# Patient Record
Sex: MALE | Race: BLACK OR AFRICAN AMERICAN | NOT HISPANIC OR LATINO | Employment: FULL TIME | URBAN - METROPOLITAN AREA
[De-identification: names, ages, dates, MRNs, and addresses within clinical notes are randomized per-mention and may not be internally consistent; named-entity substitution may affect disease eponyms.]

---

## 2017-05-10 ENCOUNTER — HOSPITAL ENCOUNTER (OUTPATIENT)
Dept: RADIOLOGY | Facility: MEDICAL CENTER | Age: 37
End: 2017-05-10
Attending: OTOLARYNGOLOGY
Payer: COMMERCIAL

## 2017-05-10 DIAGNOSIS — K11.23 CHRONIC PAROTITIS: ICD-10-CM

## 2017-05-10 DIAGNOSIS — R22.1 NECK MASS: ICD-10-CM

## 2017-05-10 DIAGNOSIS — J35.03: ICD-10-CM

## 2017-05-10 PROCEDURE — 70490 CT SOFT TISSUE NECK W/O DYE: CPT

## 2017-07-21 ENCOUNTER — APPOINTMENT (OUTPATIENT)
Dept: RADIOLOGY | Facility: MEDICAL CENTER | Age: 37
End: 2017-07-21
Attending: EMERGENCY MEDICINE
Payer: COMMERCIAL

## 2017-07-21 ENCOUNTER — HOSPITAL ENCOUNTER (EMERGENCY)
Facility: MEDICAL CENTER | Age: 37
End: 2017-07-21
Attending: EMERGENCY MEDICINE
Payer: COMMERCIAL

## 2017-07-21 VITALS
HEIGHT: 68 IN | WEIGHT: 242.06 LBS | TEMPERATURE: 98.4 F | DIASTOLIC BLOOD PRESSURE: 63 MMHG | SYSTOLIC BLOOD PRESSURE: 118 MMHG | BODY MASS INDEX: 36.69 KG/M2 | HEART RATE: 65 BPM | RESPIRATION RATE: 16 BRPM | OXYGEN SATURATION: 95 %

## 2017-07-21 DIAGNOSIS — M54.2 NECK PAIN ON LEFT SIDE: ICD-10-CM

## 2017-07-21 LAB
ANION GAP SERPL CALC-SCNC: 10 MMOL/L (ref 0–11.9)
BUN SERPL-MCNC: 16 MG/DL (ref 8–22)
CALCIUM SERPL-MCNC: 9.5 MG/DL (ref 8.5–10.5)
CHLORIDE SERPL-SCNC: 105 MMOL/L (ref 96–112)
CO2 SERPL-SCNC: 23 MMOL/L (ref 20–33)
CREAT SERPL-MCNC: 0.98 MG/DL (ref 0.5–1.4)
GFR SERPL CREATININE-BSD FRML MDRD: >60 ML/MIN/1.73 M 2
GLUCOSE SERPL-MCNC: 94 MG/DL (ref 65–99)
POTASSIUM SERPL-SCNC: 3.7 MMOL/L (ref 3.6–5.5)
SODIUM SERPL-SCNC: 138 MMOL/L (ref 135–145)

## 2017-07-21 PROCEDURE — 99283 EMERGENCY DEPT VISIT LOW MDM: CPT

## 2017-07-21 PROCEDURE — 80048 BASIC METABOLIC PNL TOTAL CA: CPT

## 2017-07-21 PROCEDURE — 70491 CT SOFT TISSUE NECK W/DYE: CPT

## 2017-07-21 PROCEDURE — 700117 HCHG RX CONTRAST REV CODE 255: Performed by: EMERGENCY MEDICINE

## 2017-07-21 RX ADMIN — IOHEXOL 80 ML: 350 INJECTION, SOLUTION INTRAVENOUS at 02:10

## 2017-07-21 ASSESSMENT — LIFESTYLE VARIABLES
DO YOU DRINK ALCOHOL: YES
TOTAL SCORE: 0
CONSUMPTION TOTAL: INCOMPLETE
EVER FELT BAD OR GUILTY ABOUT YOUR DRINKING: NO
EVER HAD A DRINK FIRST THING IN THE MORNING TO STEADY YOUR NERVES TO GET RID OF A HANGOVER: NO
TOTAL SCORE: 0
HAVE PEOPLE ANNOYED YOU BY CRITICIZING YOUR DRINKING: NO
TOTAL SCORE: 0
HAVE YOU EVER FELT YOU SHOULD CUT DOWN ON YOUR DRINKING: NO

## 2017-07-21 ASSESSMENT — PAIN SCALES - GENERAL: PAINLEVEL_OUTOF10: 10

## 2017-07-21 NOTE — ED PROVIDER NOTES
ED Provider Note    CHIEF COMPLAINT  Chief Complaint   Patient presents with   • Neck Pain     left anterior       HPI  Dean Mcmullen is a 36 y.o. male who presents here for evaluation of neck pain. The patient states that over the past 3-4 days he's been having some increased pain right at the site of his left anterior portion of sternocleidomastoid muscle. Patient states that he noticed increasing pain throughout the day today while at work when he was picking up boxes. He describes the pain as severe in intensity, sharp, worse with palpation over the site, and not associated with any injury or fever. The patient denies any previous medical history, but does state that he has a daughter who was found to have a cyst and a similar place in her neck, and he is concerned that this may be a similar presentation today.     REVIEW OF SYSTEMS   Patient denies fever, vision change, sore throat, chest pain, shortness of breath, nausea, dysuria, focal muscle pain, rashes, or neurologic deficits     PAST MEDICAL HISTORY   has a past medical history of Pseudotumor cerebri (10/21/2010) and Family history of high cholesterol.    SOCIAL HISTORY  Social History     Social History Main Topics   • Smoking status: Current Some Day Smoker   • Smokeless tobacco: Never Used      Comment: 2 cigs per day   • Alcohol Use: No   • Drug Use: No   • Sexual Activity: Yes       SURGICAL HISTORY   has past surgical history that includes appendectomy.    CURRENT MEDICATIONS  Home Medications     Reviewed by Heavenly Herring R.N. (Registered Nurse) on 07/21/17 at 0108  Med List Status: Partial    Medication Last Dose Status    amoxicillin-clavulanate (AUGMENTIN) 875-125 MG Tab  Active    Hydrocodone-Acetaminophen  MG/15ML Solution  Active    lidocaine (LIDODERM) 5 % PTCH not taking Active    ondansetron (ZOFRAN ODT) 4 MG TABLET DISPERSIBLE  Active                ALLERGIES  No Known Allergies    PHYSICAL EXAM  VITAL SIGNS: /71 mmHg  " Pulse 90  Temp(Src) 36.9 °C (98.4 °F)  Resp 18  Ht 1.727 m (5' 7.99\")  Wt 109.8 kg (242 lb 1 oz)  BMI 36.81 kg/m2  SpO2 99%   Pulse ox interpretation: I interpret this pulse ox as normal.  Constitutional: Alert in no apparent distress.  HENT: Head normocephalic, atraumatic, Bilateral external ears normal, Nose normal.  Eyes: Pupils are equal, extraocular movements intact, Conjunctiva normal, Non-icteric.   Neck: Normal range of motion, Supple, No stridor, point tenderness over the left middle sternocleidomastoid muscle, exquisite and tenderness but no overlying fluctuance, mass, erythema, or swelling is noted.   Lymphatic: No lymphadenopathy noted.   Cardiovascular: Regular rate and rhythm   Thorax & Lungs: No acute respiratory distress, No wheezing, No increased work of breathing.   Abdomen: Non-distended   Skin: Warm, Dry, No erythema, No rash.   Back: No bony tenderness, No CVA tenderness.   Extremities: Intact distal pulses, No edema, No tenderness, No cyanosis   Musculoskeletal: Good range of motion in all major joints. No tenderness to palpation or major deformities noted.   Neurologic: Alert , Normal motor function, Normal sensory function, No focal deficits noted.   Psychiatric: Affect normal, Judgment normal, Mood normal.       DIAGNOSTIC STUDIES / PROCEDURES    LABS  Labs Reviewed   BASIC METABOLIC PANEL   ESTIMATED GFR       RADIOLOGY  CT-SOFT TISSUE NECK WITH   Final Result         1.  CT of the neck soft tissues with contrast within normal limits.      2.  Previous surgical resection of right-sided submandibular gland.          COURSE & MEDICAL DECISION MAKING  Pertinent Labs & Imaging studies reviewed. (See chart for details)    Patient presented here for evaluation of left-sided neck pain. Considerations for this patient included a neck abscess, swollen lymph node, and nerve inflammation. Here CT scan of the neck with contrast shows no evidence of abscess, swollen lymph node, or any other " etiology for the patient's symptoms. Given this is a little bit unclear but it may be secondary to nerve inflammation. Given this, plan to discharge the patient with primary care follow-up, and return precautions including difficulty swallowing his secretions, fever, or any other new or concerning symptoms.    The patient will return for worsening symptoms and is stable at the time of discharge. The patient verbalizes understanding.    The patient is referred to a primary physician for blood pressure management, diabetic screening, and for all other preventative health concerns should they be present.     FINAL IMPRESSION  1. Left neck pain  2.   3.          Electronically signed by: Albert Bynum, 7/21/2017 1:39 AM    This record was made with a voice recognition software. I have tried to correct any grammar, spelling or context errors to the best of my ability, but errors may still remain. Interpretation of this chart should be taken in this context.

## 2017-07-21 NOTE — ED AVS SNAPSHOT
7/21/2017    Dean Mcmullen  [address] [place] [address]    Dear Dean PHILLIPS:    [facility] wants to ensure your discharge home is safe and you or your loved ones have had all of your questions answered regarding your care after you leave the hospital.    Below is a list of resources and contact information should you have any questions regarding your hospital stay, follow-up instructions, or active medical symptoms.    Questions or Concerns Regarding… Contact   Medical Questions Related to Your Discharge  (7 days a week, 8am-5pm) Contact a Nurse Care Coordinator   [phone]   Medical Questions Not Related to Your Discharge  (24 hours a day / 7 days a week)  Contact the Nurse Health Line   [phone]    Medications or Discharge Instructions Refer to your discharge packet   or contact your [facility] Primary Care Provider   [phone]   Follow-up Appointment(s) Schedule your appointment via [employer]   or contact Scheduling [phone]   Billing Review your statement via [employer]  or contact Billing [phone]   Medical Records Review your records via [employer]   or contact Medical Records [phone]     You may receive a telephone call within two days of discharge. This call is to make certain you understand your discharge instructions and have the opportunity to have any questions answered. You can also easily access your medical information, test results and upcoming appointments via the [employer] free online health management tool. You can learn more and sign up at [employer]. For assistance setting up your [employer] account, please call [phone].    Once again, we want to ensure your discharge home is safe and that you have a clear understanding of any next steps in your care. If you have any questions or concerns, please do not hesitate to contact us, we are here for you. Thank you for choosing [facility] for your healthcare needs.    Sincerely,    Your [facility] Healthcare Team

## 2017-07-21 NOTE — ED NOTES
Discharge instructions given to patient, a verbal understanding of all instructions was stated. IV removed, cathlon intact, site without s/s of infection. Pt preferred to walk out accompanied by wife. VSS, all belongings accounted for.

## 2017-07-21 NOTE — ED NOTES
Pt. States he had his right salivary gland removed in April of this year. Pt. States similar pain to that experience but now on his left side. Pt. Has some swelling and tenderness to the left side of his neck. Pt. States 8/10 pain. Pt. And family updated on the plan of care for ERP to see. Call light within reach. Will continue to monitor.

## 2017-07-21 NOTE — ED AVS SNAPSHOT
Home Care Instructions                                                                                                                Dean Mcmullen   MRN: [id number]    Department:  [facility], Emergency Dept   Date of Visit:  7/21/2017            [facility], Emergency Dept    [address] [facility] [address]    Phone:  [phone]      You were seen by     Albert Bynum M.D.      Your Diagnosis Was     Neck pain on left side     M54.2       These are the medications you received during your hospitalization from 07/21/2017 0019 to 07/21/2017 0252     Date/Time Order Dose Route Action    07/21/2017 0210 iohexol (OMNIPAQUE) 350 mg/mL 80 mL Intravenous Given      Follow-up Information     1. Schedule an appointment as soon as possible for a visit with Xiao Bello M.D..    Specialty:  Family Medicine    Contact information    [address]  [facility] [address] [phone]        Medication Information     Review all of your home medications and newly ordered medications with your primary doctor and/or pharmacist as soon as possible. Follow medication instructions as directed by your doctor and/or pharmacist.     Please keep your complete medication list with you and share with your physician. Update the information when medications are discontinued, doses are changed, or new medications (including over-the-counter products) are added; and carry medication information at all times in the event of emergency situations.               Medication List      ASK your doctor about these medications        Instructions    Morning Afternoon Evening Bedtime    amoxicillin-clavulanate 875-125 MG Tabs   Commonly known as:  AUGMENTIN        Take 1 Tab by mouth 2 times a day.   Dose:  1 Tab                        Hydrocodone-Acetaminophen  MG/15ML Soln        Take 10-15 mL by mouth every 6 hours as needed (pain).   Dose:  10-15 mL                        lidocaine 5 %  Ptch   Commonly known as:  LIDODERM        Apply 1 Patch to skin as directed every 24 hours.   Dose:  1 Patch                        ondansetron 4 MG Tbdp   Commonly known as:  ZOFRAN ODT        Take 1 Tab by mouth every 8 hours as needed for Nausea/Vomiting.   Dose:  4 mg                                Procedures and tests performed during your visit     BASIC METABOLIC PANEL    CONSENT FOR CONTRAST INJECTION    CT-SOFT TISSUE NECK WITH    ESTIMATED GFR        Discharge Instructions       Please return to the emergency department if you develop fever, increased neck swelling, difficulty speaking, or swallowing her saliva, or any other new or concerning symptoms.          Patient Information     Patient Information    Following emergency treatment: all patient requiring follow-up care must return either to a private physician or a clinic if your condition worsens before you are able to obtain further medical attention, please return to the emergency room.     Billing Information    At [facility], we work to make the billing process streamlined for our patients.  Our Representatives are here to answer any questions you may have regarding your hospital bill.  If you have insurance coverage and have supplied your insurance information to us, we will submit a claim to your insurer on your behalf.  Should you have any questions regarding your bill, we can be reached online or by phone as follows:  Online: You are able pay your bills online or live chat with our representatives about any billing questions you may have. We are here to help Monday - Friday from 8:00am to 7:30pm and 9:00am - 12:00pm on Saturdays.  Please visit [email or web address]  for more information.   Phone:  [phone] or [phone]    Please note that your emergency physician, surgeon, pathologist, radiologist, anesthesiologist, and other specialists are not employed by [facility] and will therefore bill separately  for their services.  Please contact them directly for any questions concerning their bills at the numbers below:     Emergency Physician Services:  [phone]  [facility]:  [phone]  [facility]:  [phone]  [facility]:  [phone]    1. Your final bill may vary from the amount quoted upon discharge if all procedures are not complete at that time, or if your doctor has additional procedures of which we are not aware. You will receive an additional bill if you return to the Emergency Department at [facility] access hospital for suture removal regardless of the facility of which the sutures were placed.     2. Please arrange for settlement of this account at the emergency registration.    3. All self-pay accounts are due in full at the time of treatment.  If you are unable to meet this obligation then payment is expected within 4-5 days.     4. If you have had radiology studies (CT, X-ray, Ultrasound, MRI), you have received a preliminary result during your emergency department visit. Please contact the radiology department [phone] to receive a copy of your final result. Please discuss the Final result with your primary physician or with the follow up physician provided.     Crisis Hotline:  [place] Crisis Hotline:  6-632-BFDMLNE or 1-154.375.1154  [place] Crisis Hotline:    1-459.639.8462 or 012-515-5771         ED Discharge Follow Up Questions    1. In order to provide you with very good care, we would like to follow up with a phone call in the next few days.  May we have your permission to contact you?     YES /  NO    2. What is the best phone number to call you? (       )_____-__________    3. What is the best time to call you?      Morning  /  Afternoon  /  Evening                   Patient Signature:  ____________________________________________________________    Date:  ____________________________________________________________

## 2017-07-21 NOTE — ED NOTES
Dean Mcmullen  36 y.o.  male  Chief Complaint   Patient presents with   • Neck Pain     left anterior     Present c/o left anterior neck pain started yesterday. Small lump noted. Pain with palpation. Hx of salivary stone removed surgically but on the right side. Airway clear.

## 2017-07-21 NOTE — DISCHARGE INSTRUCTIONS
Please return to the emergency department if you develop fever, increased neck swelling, difficulty speaking, or swallowing her saliva, or any other new or concerning symptoms.

## 2018-08-07 ENCOUNTER — HOSPITAL ENCOUNTER (OUTPATIENT)
Dept: RADIOLOGY | Facility: MEDICAL CENTER | Age: 38
End: 2018-08-07
Attending: OTOLARYNGOLOGY
Payer: COMMERCIAL

## 2018-08-07 DIAGNOSIS — R22.1 NECK MASS: ICD-10-CM

## 2018-08-07 PROCEDURE — 70491 CT SOFT TISSUE NECK W/DYE: CPT

## 2018-08-07 PROCEDURE — 700117 HCHG RX CONTRAST REV CODE 255: Performed by: OTOLARYNGOLOGY

## 2018-08-07 RX ADMIN — IOHEXOL 100 ML: 350 INJECTION, SOLUTION INTRAVENOUS at 11:14

## 2018-10-23 ENCOUNTER — HOSPITAL ENCOUNTER (OUTPATIENT)
Dept: RADIOLOGY | Facility: MEDICAL CENTER | Age: 38
End: 2018-10-23
Attending: OTOLARYNGOLOGY
Payer: COMMERCIAL

## 2018-10-23 DIAGNOSIS — R22.1 NECK MASS: ICD-10-CM

## 2018-10-23 PROCEDURE — 76536 US EXAM OF HEAD AND NECK: CPT

## 2018-10-31 ENCOUNTER — OFFICE VISIT (OUTPATIENT)
Dept: MEDICAL GROUP | Facility: MEDICAL CENTER | Age: 38
End: 2018-10-31
Payer: COMMERCIAL

## 2018-10-31 ENCOUNTER — HOSPITAL ENCOUNTER (OUTPATIENT)
Dept: LAB | Facility: MEDICAL CENTER | Age: 38
End: 2018-10-31
Attending: NURSE PRACTITIONER
Payer: COMMERCIAL

## 2018-10-31 ENCOUNTER — HOSPITAL ENCOUNTER (OUTPATIENT)
Dept: RADIOLOGY | Facility: MEDICAL CENTER | Age: 38
End: 2018-10-31
Attending: NURSE PRACTITIONER
Payer: COMMERCIAL

## 2018-10-31 VITALS
RESPIRATION RATE: 16 BRPM | TEMPERATURE: 97.2 F | HEART RATE: 86 BPM | OXYGEN SATURATION: 97 % | BODY MASS INDEX: 38.3 KG/M2 | HEIGHT: 67 IN | WEIGHT: 244 LBS | DIASTOLIC BLOOD PRESSURE: 76 MMHG | SYSTOLIC BLOOD PRESSURE: 130 MMHG

## 2018-10-31 DIAGNOSIS — G89.29 CHRONIC RIGHT-SIDED LOW BACK PAIN WITH RIGHT-SIDED SCIATICA: ICD-10-CM

## 2018-10-31 DIAGNOSIS — M25.50 MULTIPLE JOINT PAIN: ICD-10-CM

## 2018-10-31 DIAGNOSIS — M54.41 CHRONIC RIGHT-SIDED LOW BACK PAIN WITH RIGHT-SIDED SCIATICA: ICD-10-CM

## 2018-10-31 DIAGNOSIS — K11.9 SALIVARY GLAND DISTURBANCE: ICD-10-CM

## 2018-10-31 LAB
ERYTHROCYTE [SEDIMENTATION RATE] IN BLOOD BY WESTERGREN METHOD: 12 MM/HOUR (ref 0–15)
RHEUMATOID FACT SER IA-ACNC: <10 IU/ML (ref 0–14)

## 2018-10-31 PROCEDURE — 99203 OFFICE O/P NEW LOW 30 MIN: CPT | Performed by: NURSE PRACTITIONER

## 2018-10-31 PROCEDURE — 86431 RHEUMATOID FACTOR QUANT: CPT

## 2018-10-31 PROCEDURE — 86200 CCP ANTIBODY: CPT

## 2018-10-31 PROCEDURE — 84550 ASSAY OF BLOOD/URIC ACID: CPT

## 2018-10-31 PROCEDURE — 36415 COLL VENOUS BLD VENIPUNCTURE: CPT

## 2018-10-31 PROCEDURE — 85652 RBC SED RATE AUTOMATED: CPT

## 2018-10-31 PROCEDURE — 77077 JOINT SURVEY SINGLE VIEW: CPT

## 2018-10-31 ASSESSMENT — ENCOUNTER SYMPTOMS: BACK PAIN: 1

## 2018-10-31 ASSESSMENT — PATIENT HEALTH QUESTIONNAIRE - PHQ9: CLINICAL INTERPRETATION OF PHQ2 SCORE: 0

## 2018-10-31 NOTE — PROGRESS NOTES
Subjective:      Dean Mcmullen is a 38 y.o. male who presents with Establish Care and Other (joint pain)        CC: This is a prior patient of Dr. Palmer here today to establish care and for issues with joint pain and back pain.  She was last seen by her PCP in 2014.    HPI Dean Mcmullen      1. Chronic right-sided low back pain with right-sided sciatica  Patient reports his problems started about 4 months ago at work when he was lifting a heavy box for his job as a .  He was seen under Workmen's Comp. for this and attended physical therapy and the case was closed.  He states he is still having some issues with low back pain mostly on the right side and there is sometimes radiation of pain to his right upper leg and sometimes to his ankle.  It is worse with prolonged sitting or bending.  It is better with rest.  He does not like to take medications.  He denies loss of bowel or bladder control or weakness of the extremities.    2. Multiple joint pain  Patient reports that he has noticed over the past year or 2 that he has been having pain in multiple other joints as well.  It is worse in his hands bilaterally but sometimes it occurs in his knees and elbows.  He does work at a job which requires much lifting and sitting.  He has not noticed redness or swelling of the joints.    3. Salivary gland disturbance  Patient is currently following with ENT because of recurring swelling of the salivary gland on the right side.  He recently has had imaging and may be going for biopsy in the near future.  Social History   Substance Use Topics   • Smoking status: Former Smoker     Types: Cigarettes     Quit date: 7/31/2018   • Smokeless tobacco: Never Used      Comment: 2 cigs per day   • Alcohol use No     No current outpatient prescriptions on file.     No current facility-administered medications for this visit.      Past Medical History:   Diagnosis Date   • Family history of high cholesterol    • Pseudotumor  "cerebri 10/21/2010     Family History   Problem Relation Age of Onset   • Diabetes Father    • Cancer Maternal Grandmother 55        colon   • Cancer Paternal Grandfather         lung       Review of Systems   Musculoskeletal: Positive for back pain and joint pain.   All other systems reviewed and are negative.         Objective:     /76 (BP Location: Right arm, Patient Position: Sitting, BP Cuff Size: Adult)   Pulse 86   Temp 36.2 °C (97.2 °F) (Temporal)   Resp 16   Ht 1.702 m (5' 7\")   Wt 110.7 kg (244 lb)   SpO2 97%   BMI 38.22 kg/m²      Physical Exam   Constitutional: He is oriented to person, place, and time. He appears well-developed and well-nourished. No distress.   HENT:   Head: Normocephalic and atraumatic.   Right Ear: External ear normal.   Left Ear: External ear normal.   Nose: Nose normal.   Mouth/Throat: Oropharynx is clear and moist.   Eyes: Conjunctivae are normal. Right eye exhibits no discharge. Left eye exhibits no discharge.   Neck: Normal range of motion. Neck supple. No tracheal deviation present. No thyromegaly present.   Cardiovascular: Normal rate, regular rhythm and normal heart sounds.    No murmur heard.  Pulmonary/Chest: Effort normal and breath sounds normal. No respiratory distress. He has no wheezes. He has no rales.   Lymphadenopathy:     He has no cervical adenopathy.   Neurological: He is alert and oriented to person, place, and time. Coordination normal.   5/5 strength of lower extremities bilaterally.   Skin: Skin is warm and dry. No rash noted. He is not diaphoretic. No erythema.   Scar on right ear area needs chin with some mild swelling.   Psychiatric: He has a normal mood and affect. His behavior is normal. Judgment and thought content normal.   Nursing note and vitals reviewed.              Assessment/Plan:     1. Chronic right-sided low back pain with right-sided sciatica  Patient symptoms have now been present for a few months and it is no longer a Workmen's " Compensation case.  He states he did have x-rays which were normal at his Workmen's Compensation provider.  He would be willing to try some more physical therapy and now would like to go on to physiatry to look at possibilities including epidural injections since the pain is still present.  - REFERRAL TO PHYSIATRY (PMR)  - REFERRAL TO PHYSICAL THERAPY Reason for Therapy: Eval/Treat/Report    2. Multiple joint pain  Patient's wife would like him to be checked for rheumatoid arthritis and I will do so and if positive, will refer him on to rheumatology.  Otherwise he will follow with physiatry.  - RHEUMATOID ARTHRITIS FACTOR; Future  - CCP ANTIBODY; Future  - WESTERGREN SED RATE; Future  - URIC ACID; Future  - DX-JOINT SURVEY-HANDS SINGLE VIEW; Future    3. Salivary gland disturbance  Patient will continue to follow with ENT who is working this up.

## 2018-11-01 LAB — URATE SERPL-MCNC: 6.7 MG/DL (ref 2.5–8.3)

## 2018-11-02 DIAGNOSIS — Z00.00 ROUTINE GENERAL MEDICAL EXAMINATION AT A HEALTH CARE FACILITY: ICD-10-CM

## 2018-11-02 LAB — CCP IGG SERPL-ACNC: 3 UNITS (ref 0–19)

## 2018-11-06 ENCOUNTER — HOSPITAL ENCOUNTER (OUTPATIENT)
Dept: RADIOLOGY | Facility: MEDICAL CENTER | Age: 38
End: 2018-11-06
Attending: OTOLARYNGOLOGY
Payer: COMMERCIAL

## 2018-11-06 DIAGNOSIS — R22.1 LOCALIZED SWELLING, MASS AND LUMP, NECK: ICD-10-CM

## 2018-11-06 PROCEDURE — 10022 IR-NEEDLE BX-THYROID: CPT

## 2018-11-06 PROCEDURE — 88112 CYTOPATH CELL ENHANCE TECH: CPT

## 2018-11-07 LAB — CYTOLOGY REG CYTOL: NORMAL

## 2018-11-07 NOTE — PROGRESS NOTES
"Patient given [facility] \"Preventing the Spread of Infection\" Brochure upon being checked in.     US guided right submandibular nodule fine needle aspiration done by Dr. Oh; NON-SEDATION  (no H&P required as this is a NON SEDATION procedure); right anterior aspect of neck access site; procedural RN: Nuha; time out completed by all staff; 1 jar of cytolyt obtained and sent to pathology lab; pt tolerated the procedure well; pt remained stable pre/intra/post procedure; all questions and concerns answered prior to being d/c; patient provided with appropriate education for procedure; pt d/c home.     "

## 2019-01-14 ENCOUNTER — APPOINTMENT (OUTPATIENT)
Dept: RADIOLOGY | Facility: IMAGING CENTER | Age: 39
End: 2019-01-14
Attending: FAMILY MEDICINE
Payer: COMMERCIAL

## 2019-01-14 ENCOUNTER — OFFICE VISIT (OUTPATIENT)
Dept: URGENT CARE | Facility: CLINIC | Age: 39
End: 2019-01-14
Payer: COMMERCIAL

## 2019-01-14 VITALS
SYSTOLIC BLOOD PRESSURE: 110 MMHG | WEIGHT: 237.4 LBS | DIASTOLIC BLOOD PRESSURE: 72 MMHG | HEART RATE: 95 BPM | RESPIRATION RATE: 20 BRPM | BODY MASS INDEX: 37.26 KG/M2 | TEMPERATURE: 99.8 F | OXYGEN SATURATION: 92 % | HEIGHT: 67 IN

## 2019-01-14 DIAGNOSIS — R68.89 FLU-LIKE SYMPTOMS: ICD-10-CM

## 2019-01-14 LAB
FLUAV+FLUBV AG SPEC QL IA: NEGATIVE
INT CON NEG: NORMAL
INT CON POS: NORMAL

## 2019-01-14 PROCEDURE — 87804 INFLUENZA ASSAY W/OPTIC: CPT | Performed by: FAMILY MEDICINE

## 2019-01-14 PROCEDURE — 99214 OFFICE O/P EST MOD 30 MIN: CPT | Performed by: FAMILY MEDICINE

## 2019-01-14 PROCEDURE — 71046 X-RAY EXAM CHEST 2 VIEWS: CPT | Mod: 26 | Performed by: FAMILY MEDICINE

## 2019-01-14 RX ORDER — DICLOFENAC SODIUM 75 MG/1
75 TABLET, DELAYED RELEASE ORAL 2 TIMES DAILY
Qty: 30 TAB | Refills: 0 | Status: SHIPPED | OUTPATIENT
Start: 2019-01-14 | End: 2020-07-24

## 2019-01-14 ASSESSMENT — ENCOUNTER SYMPTOMS
FEVER: 1
COUGH: 1
NAUSEA: 0
HEADACHES: 0
ABDOMINAL PAIN: 0
DIARRHEA: 0
VOMITING: 0
SHORTNESS OF BREATH: 0
SORE THROAT: 1

## 2019-01-14 NOTE — LETTER
January 14, 2019    To Whom It May Concern:         This is confirmation that Dean Mcmullen attended his scheduled appointment with Jewel Parker M.D. on 1/14/19.  Please excuse him from work.           If you have any questions please do not hesitate to call me at the phone number listed below.    Sincerely,          Jewel Parker M.D.  [phone]

## 2019-01-15 NOTE — PROGRESS NOTES
"Subjective:     Dean Mcmullen is a 38 y.o. male who presents for Cough (X 3 days, body aches, chest tight, chest congestion, wheezing, fever, cough)    HPI  Pt presents for evaluation of a new problem  Pt feeling ill for the past 3 days   Having fevers on and off   Having moderate dry cough which is improving   Myalgias are the worst symptom   Pain is mainly in the back, constantly present, and does not radiate   Nothing makes myalgias better   +Rhinorrhea   Thinks he is wheezing     Review of Systems   Constitutional: Positive for fever.   HENT: Positive for congestion and sore throat.    Respiratory: Positive for cough. Negative for shortness of breath.    Cardiovascular: Negative for chest pain.   Gastrointestinal: Negative for abdominal pain, diarrhea, nausea and vomiting.   Skin: Negative for rash.   Neurological: Negative for headaches.     PMH: No hx of asthma, +hx of salivary stones  MEDS: No daily meds   ALLERGIES: No Known Allergies  SURGHX:   Past Surgical History:   Procedure Laterality Date   • APPENDECTOMY     • LYMPH NODE SAMPLING       SOCHX:  reports that he quit smoking about 5 months ago. His smoking use included Cigarettes. He has never used smokeless tobacco. He reports that he does not drink alcohol or use drugs.  FH: Family history was reviewed, not contributing to acute infection      Objective:   /72 (BP Location: Left arm, Patient Position: Sitting, BP Cuff Size: Large adult)   Pulse 95   Temp 37.7 °C (99.8 °F) (Temporal)   Resp 20   Ht 1.695 m (5' 6.73\")   Wt 107.7 kg (237 lb 6.4 oz)   SpO2 92%   BMI 37.48 kg/m²     Physical Exam   Constitutional: He appears well-developed and well-nourished. No distress.   HENT:   Head: Normocephalic and atraumatic.   Right Ear: External ear normal.   Left Ear: External ear normal.   Nose: Nose normal.   Mouth/Throat: Oropharynx is clear and moist. No oropharyngeal exudate.   Eyes: Pupils are equal, round, and reactive to light. " Conjunctivae and EOM are normal. Right eye exhibits no discharge. Left eye exhibits no discharge. No scleral icterus.   Neck: Normal range of motion. No tracheal deviation present.   Cardiovascular: Normal rate, regular rhythm and normal heart sounds.    Pulmonary/Chest: Effort normal.   Scattered wheezes and rhonchi throughout  No focal rales   Musculoskeletal: Normal range of motion.   Neurological: He is alert.   Skin: Skin is warm and dry. No rash noted. He is not diaphoretic.   Psychiatric: He has a normal mood and affect. His behavior is normal. Judgment and thought content normal.     Assessment/Plan:   Assessment    1. Flu-like symptoms  Patient is a 38-year-old male with flulike symptoms for the past 3 days.  Rapid influenza test negative.  Likely has parainfluenza or similar viral illness.  Chest x-ray does not show infiltrates.  Since his myalgias are the worst symptom, will treat with anti-inflammatories.  His cough is improving and patient declines prescription cough medication.  Reviewed follow-up precautions.  Follow-up as needed.  - POCT Influenza A/B  - DX-CHEST-2 VIEWS; Future  - diclofenac EC (VOLTAREN) 75 MG Tablet Delayed Response; Take 1 Tab by mouth 2 times a day.  Dispense: 30 Tab; Refill: 0
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
## 2019-08-19 ENCOUNTER — OFFICE VISIT (OUTPATIENT)
Dept: URGENT CARE | Facility: CLINIC | Age: 39
End: 2019-08-19
Payer: COMMERCIAL

## 2019-08-19 VITALS
SYSTOLIC BLOOD PRESSURE: 118 MMHG | DIASTOLIC BLOOD PRESSURE: 76 MMHG | HEIGHT: 68 IN | HEART RATE: 67 BPM | RESPIRATION RATE: 18 BRPM | BODY MASS INDEX: 37.28 KG/M2 | TEMPERATURE: 99 F | OXYGEN SATURATION: 97 % | WEIGHT: 246 LBS

## 2019-08-19 DIAGNOSIS — M25.571 CHRONIC PAIN OF RIGHT ANKLE: ICD-10-CM

## 2019-08-19 DIAGNOSIS — M79.661 RIGHT CALF PAIN: ICD-10-CM

## 2019-08-19 DIAGNOSIS — G89.29 CHRONIC PAIN OF RIGHT ANKLE: ICD-10-CM

## 2019-08-19 PROCEDURE — 99214 OFFICE O/P EST MOD 30 MIN: CPT | Performed by: NURSE PRACTITIONER

## 2019-08-19 RX ORDER — MELOXICAM 7.5 MG/1
7.5 TABLET ORAL 2 TIMES DAILY PRN
Refills: 2 | COMMUNITY
Start: 2019-08-15 | End: 2020-10-16

## 2019-08-19 RX ORDER — GABAPENTIN 100 MG/1
CAPSULE ORAL
Refills: 0 | COMMUNITY
Start: 2019-08-15 | End: 2020-10-16

## 2019-08-19 RX ORDER — METHYLPREDNISOLONE 4 MG/1
4 TABLET ORAL DAILY
Qty: 1 KIT | Refills: 0 | Status: SHIPPED | OUTPATIENT
Start: 2019-08-19 | End: 2020-07-24

## 2019-08-20 ENCOUNTER — TELEPHONE (OUTPATIENT)
Dept: URGENT CARE | Facility: CLINIC | Age: 39
End: 2019-08-20

## 2019-08-20 ENCOUNTER — HOSPITAL ENCOUNTER (OUTPATIENT)
Dept: RADIOLOGY | Facility: MEDICAL CENTER | Age: 39
End: 2019-08-20
Attending: NURSE PRACTITIONER
Payer: COMMERCIAL

## 2019-08-20 DIAGNOSIS — M79.661 RIGHT CALF PAIN: ICD-10-CM

## 2019-08-20 PROCEDURE — 93971 EXTREMITY STUDY: CPT | Mod: RT

## 2019-08-20 NOTE — TELEPHONE ENCOUNTER
Called pt to inform him of the ultrasound appointment for today. Check in at 1:45pm for today @ [address] and bring ID & Insurance. Pt understands message.

## 2019-08-24 ASSESSMENT — ENCOUNTER SYMPTOMS
TINGLING: 0
FOCAL WEAKNESS: 0
SENSORY CHANGE: 0
FEVER: 0
MYALGIAS: 0
CHILLS: 0

## 2019-08-24 NOTE — PROGRESS NOTES
Subjective:      Dean Mcmullen is a 39 y.o. male who presents with Ankle Pain (x2MO or more, getting worse, right ankle pain radiates up to calf, sharp pain, certain movements makes it worse, cannot step on it out of nowhere)            HPI New. 39 year old male with ankle pain x 2 months. This pain is moderate, radiating up his leg, sharp and difficulty with ambulation. Denies any known injury, swelling or erythema. He has no fever, chills or other joint pain. No history of gout. Pain is not constant but is concerning as it is worsening for him. His spouse is concerned about blood clot. Denies any foot or knee pain. Taking ibuprofen/aleve otc with minimal relief.  Patient has no known allergies.  Current Outpatient Medications on File Prior to Visit   Medication Sig Dispense Refill   • gabapentin (NEURONTIN) 100 MG Cap TAKE ONE CAPSULE BY MOUTH EVERY MORNING AND TAKE 2 CAPSULES EVERY EVENING  0   • meloxicam (MOBIC) 7.5 MG Tab Take 7.5 mg by mouth 2 times a day as needed.  2   • diclofenac EC (VOLTAREN) 75 MG Tablet Delayed Response Take 1 Tab by mouth 2 times a day. (Patient not taking: Reported on 2019) 30 Tab 0     No current facility-administered medications on file prior to visit.      Social History     Socioeconomic History   • Marital status: Single     Spouse name: Not on file   • Number of children: Not on file   • Years of education: Not on file   • Highest education level: Not on file   Occupational History   • Occupation: , package     Employer: [employer]   Social Needs   • Financial resource strain: Not on file   • Food insecurity:     Worry: Not on file     Inability: Not on file   • Transportation needs:     Medical: Not on file     Non-medical: Not on file   Tobacco Use   • Smoking status: Former Smoker     Types: Cigarettes     Last attempt to quit: 2018     Years since quittin.0   • Smokeless tobacco: Never Used   • Tobacco comment: 2 cigs per day   Substance and Sexual Activity  "  • Alcohol use: No   • Drug use: No   • Sexual activity: Yes     Partners: Female   Lifestyle   • Physical activity:     Days per week: Not on file     Minutes per session: Not on file   • Stress: Not on file   Relationships   • Social connections:     Talks on phone: Not on file     Gets together: Not on file     Attends Sikhism service: Not on file     Active member of club or organization: Not on file     Attends meetings of clubs or organizations: Not on file     Relationship status: Not on file   • Intimate partner violence:     Fear of current or ex partner: Not on file     Emotionally abused: Not on file     Physically abused: Not on file     Forced sexual activity: Not on file   Other Topics Concern   • Not on file   Social History Narrative   • Not on file     Breast Cancer-related family history is not on file.      Review of Systems   Constitutional: Negative for chills, fever and malaise/fatigue.   Musculoskeletal: Positive for joint pain. Negative for myalgias.   Neurological: Negative for tingling, sensory change and focal weakness.          Objective:     /76 (BP Location: Left arm, Patient Position: Sitting, BP Cuff Size: Large adult)   Pulse 67   Temp 37.2 °C (99 °F) (Temporal)   Resp 18   Ht 1.727 m (5' 8\")   Wt 111.6 kg (246 lb)   SpO2 97%   BMI 37.40 kg/m²      Physical Exam   Constitutional: He is oriented to person, place, and time. He appears well-developed and well-nourished. No distress.   Cardiovascular: Normal rate, regular rhythm and normal heart sounds.   No murmur heard.  Pulmonary/Chest: Effort normal and breath sounds normal. No respiratory distress.   Musculoskeletal:        Right ankle: He exhibits decreased range of motion. He exhibits no swelling, no ecchymosis and no deformity. Tenderness. Lateral malleolus and proximal fibula tenderness found.   Neurological: He is alert and oriented to person, place, and time.   Skin: Skin is warm and dry. No erythema. "   Psychiatric: He has a normal mood and affect. His behavior is normal. Thought content normal.   Vitals reviewed.              Assessment/Plan:     1. Chronic pain of right ankle  methylPREDNISolone (MEDROL DOSEPAK) 4 MG Tablet Therapy Pack   2. Right calf pain  US-EXTREMITY VENOUS LOWER UNILAT RIGHT     Will call with ultrasound results but suspicion for this is low.  Medrol trial   Differential diagnosis, natural history, supportive care, and indications for immediate follow-up discussed at length.

## 2020-07-24 DIAGNOSIS — L05.91 INFECTED PILONIDAL CYST: ICD-10-CM

## 2020-07-24 RX ORDER — SULFAMETHOXAZOLE AND TRIMETHOPRIM 800; 160 MG/1; MG/1
1 TABLET ORAL 2 TIMES DAILY
Qty: 14 TAB | Refills: 0 | Status: SHIPPED | OUTPATIENT
Start: 2020-07-24 | End: 2020-07-31

## 2020-10-16 ENCOUNTER — OFFICE VISIT (OUTPATIENT)
Dept: MEDICAL GROUP | Facility: MEDICAL CENTER | Age: 40
End: 2020-10-16
Payer: COMMERCIAL

## 2020-10-16 VITALS
HEIGHT: 67 IN | OXYGEN SATURATION: 100 % | DIASTOLIC BLOOD PRESSURE: 68 MMHG | HEART RATE: 61 BPM | WEIGHT: 270 LBS | BODY MASS INDEX: 42.38 KG/M2 | TEMPERATURE: 98.4 F | SYSTOLIC BLOOD PRESSURE: 114 MMHG | RESPIRATION RATE: 16 BRPM

## 2020-10-16 DIAGNOSIS — D76.3 ROSAI-DORFMAN DISEASE (HCC): ICD-10-CM

## 2020-10-16 DIAGNOSIS — G93.2 BENIGN INTRACRANIAL HYPERTENSION: Chronic | ICD-10-CM

## 2020-10-16 DIAGNOSIS — E66.01 MORBID OBESITY (HCC): ICD-10-CM

## 2020-10-16 DIAGNOSIS — R73.09 ELEVATED GLUCOSE LEVEL: ICD-10-CM

## 2020-10-16 DIAGNOSIS — Z63.4 RECENT BEREAVEMENT: ICD-10-CM

## 2020-10-16 DIAGNOSIS — Z23 NEED FOR DIPHTHERIA-TETANUS-PERTUSSIS (TDAP) VACCINE: ICD-10-CM

## 2020-10-16 PROCEDURE — 99214 OFFICE O/P EST MOD 30 MIN: CPT | Mod: 25 | Performed by: FAMILY MEDICINE

## 2020-10-16 PROCEDURE — 90715 TDAP VACCINE 7 YRS/> IM: CPT | Performed by: FAMILY MEDICINE

## 2020-10-16 PROCEDURE — 90471 IMMUNIZATION ADMIN: CPT | Performed by: FAMILY MEDICINE

## 2020-10-16 SDOH — SOCIAL STABILITY - SOCIAL INSECURITY: DISSAPEARANCE AND DEATH OF FAMILY MEMBER: Z63.4

## 2020-10-16 ASSESSMENT — PATIENT HEALTH QUESTIONNAIRE - PHQ9
CLINICAL INTERPRETATION OF PHQ2 SCORE: 2
SUM OF ALL RESPONSES TO PHQ QUESTIONS 1-9: 10
5. POOR APPETITE OR OVEREATING: 0 - NOT AT ALL

## 2020-10-16 NOTE — PROGRESS NOTES
Chief Complaint   Patient presents with   • Mass     neck   • Obesity   • Elevated Glucose       Subjective:     HPI:   Dean Mcmullen presents today with the followin. 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  He finds this episodically.  At this point even when it is not flaring he has palpable firm nodules.  He has had a thorough work-up and does follow as needed with Dr. Moses, ear nose and throat specialist.  This does flare periodically giving him pain and swelling.  At times with this he cannot move his neck without significant pain and even has trouble swallowing at times.  Denies any trouble breathing though that can occur.  He occasionally has to miss work because of this.  He cannot drive when it is flared as he cannot turn his neck well.  As he says there are often several months at a time where he has no issue at all and then there are many months where he is having an issue and has to miss work several times during that month to maybe 3 days.  Once he actually had to miss a whole week.    2. Need for diphtheria-tetanus-pertussis (Tdap) vaccine  Administered today    3. Recent bereavement  He just lost his father to diabetes and kidney failure as well as a superimposed brain infection.  He is understandably sad and grieving but seems very appropriate about his loss.    4. Pseudotumor cerebri  Patient has been diagnosed with pseudotumor cerebri.  He does need to see Dr. Matias again.  They are making an appointment.    5. Morbid obesity (HCC)  Patient does have morbid obesity and has been putting on weight very quickly the last couple of months.  He feels some of it may be stress related but it is very surprising to him.  Follow-up lab orders are discussed and placed including thyroid testing.    6. Elevated glucose level  Patient does have history of a mildly elevated glucose level and strong family history of diabetes.  " Follow-up lab orders are needed and these are placed.        Patient Active Problem List    Diagnosis Date Noted   • Rosai-Tea disease (HCC) 10/16/2020   • Salivary gland disturbance 10/31/2018   • Family history of high cholesterol    • Pseudotumor cerebri 10/21/2010       Current medicines (including changes today)  No current outpatient medications on file.     No current facility-administered medications for this visit.        No Known Allergies    ROS: As per HPI       Objective:     /68   Pulse 61   Temp 36.9 °C (98.4 °F)   Resp 16   Ht 1.702 m (5' 7\")   Wt 122.5 kg (270 lb)   SpO2 100%  Body mass index is 42.29 kg/m².    Physical Exam:  Constitutional: Well-developed and well-nourished. Not diaphoretic. No distress. Lucid and fluent.  Patient, spouse, physician and staff all wearing masks.  Skin: Skin is warm and dry. No rash noted.  Head: Atraumatic without lesions.  Eyes: Conjunctivae and extraocular motions are normal. Pupils are equal, round, and reactive to light. No scleral icterus.   Neck: Supple, trachea midline. No thyromegaly present. No cervical or supraclavicular lymphadenopathy. No JVD or carotid bruits appreciated  Cardiovascular: Regular rate and rhythm.  Normal S1, S2 without murmur appreciated.  Chest: Effort normal. Clear to auscultation throughout. No adventitious sounds.   Abdomen: Soft, non tender, and without distention. Active bowel sounds in all four quadrants. No rebound, guarding, masses or hepatosplenomegaly.  Extremities: No cyanosis, clubbing, erythema, nor edema.   Neurological: Alert and oriented x 3.  No tremor appreciated.  Movements are strong and symmetric.  Psychiatric:  Behavior, mood, and affect are appropriate.  Recent bereavement, grief from that.       Assessment and Plan:     40 y.o. male with the following issues:    1. Rosai-Tea disease (HCC)  Comp Metabolic Panel    CBC WITHOUT DIFFERENTIAL   2. Need for diphtheria-tetanus-pertussis (Tdap) " vaccine  Tdap Vaccine =>8YO IM   3. Recent bereavement     4. Pseudotumor cerebri  Comp Metabolic Panel    TSH   5. Morbid obesity (HCC)  Patient identified as having weight management issue.  Appropriate orders and counseling given.    TSH   6. Elevated glucose level  Comp Metabolic Panel    Lipid Profile    HEMOGLOBIN A1C     [facility] paperwork for his work is completed today.    Followup: Return in about 6 months (around 4/16/2021), or if symptoms worsen or fail to improve.

## 2021-01-04 DIAGNOSIS — D76.3 ROSAI-DORFMAN DISEASE (HCC): ICD-10-CM

## 2021-09-27 ENCOUNTER — OFFICE VISIT (OUTPATIENT)
Dept: URGENT CARE | Facility: PHYSICIAN GROUP | Age: 41
End: 2021-09-27

## 2021-09-27 VITALS
BODY MASS INDEX: 35.07 KG/M2 | TEMPERATURE: 98.6 F | SYSTOLIC BLOOD PRESSURE: 116 MMHG | HEART RATE: 60 BPM | HEIGHT: 70 IN | OXYGEN SATURATION: 100 % | RESPIRATION RATE: 16 BRPM | DIASTOLIC BLOOD PRESSURE: 56 MMHG | WEIGHT: 245 LBS

## 2021-09-27 DIAGNOSIS — Z02.89 ENCOUNTER FOR EXAMINATION REQUIRED BY DEPARTMENT OF TRANSPORTATION (DOT): ICD-10-CM

## 2021-09-27 PROCEDURE — 7100 PR DOT PHYSICAL: Performed by: PHYSICIAN ASSISTANT

## 2021-09-28 NOTE — PROGRESS NOTES
"Subjective:   Dean Mcmullen is a 41 y.o. male who presents for Annual Exam (DOT physical )     Annual Exam    Patient presents for DOT physical.  He has been DOT certified in the past, currently .  He has never been denied certification and never been certified for less than 2 years.    Reviewed past medical, surgical and family history.  Reviewed prescription and over-the-counter medications with patient and electronic health record today.    ROS   Objective:   /56   Pulse 60   Temp 37 °C (98.6 °F) (Temporal)   Resp 16   Ht 1.778 m (5' 10\")   Wt 111 kg (245 lb)   SpO2 100%   BMI 35.15 kg/m²   Physical Exam     Assessment/Plan:   1. Encounter for examination required by Department of Transportation (DOT)    Cleared for 2-year certification, see scanned document.      Upon entering exam room I ensured patient was wearing a mask.  This provider wore appropriate PPE throughout entire visit.  Patient wore mask entire visit except for a brief period while examining oropharynx.    Please note that this note was created using voice recognition speech to text software. Every effort has been made to correct obvious errors.  However, I expect there are errors of grammar and possibly context that were not discovered prior to finalizing the note  LUCINA Grajeda PA-C  "

## 2022-06-23 DIAGNOSIS — R06.02 SHORTNESS OF BREATH: ICD-10-CM

## 2022-06-23 DIAGNOSIS — R05.8 COUGH PRODUCTIVE OF PURULENT SPUTUM: ICD-10-CM

## 2022-06-23 RX ORDER — METHYLPREDNISOLONE 4 MG/1
TABLET ORAL
Qty: 21 TABLET | Refills: 0 | Status: SHIPPED | OUTPATIENT
Start: 2022-06-23 | End: 2022-07-25

## 2022-06-23 RX ORDER — AZITHROMYCIN 250 MG/1
TABLET, FILM COATED ORAL
Qty: 6 TABLET | Refills: 0 | Status: SHIPPED | OUTPATIENT
Start: 2022-06-23 | End: 2022-07-25

## 2022-06-23 NOTE — PROGRESS NOTES
COVID last week.  Foul phlegm is present, very dark.  Short of breath on lying down and with some exertion.  zpak and medrol prescribed.  He will go to ER if he worsens.

## 2022-07-25 ENCOUNTER — OFFICE VISIT (OUTPATIENT)
Dept: MEDICAL GROUP | Facility: MEDICAL CENTER | Age: 42
End: 2022-07-25
Payer: COMMERCIAL

## 2022-07-25 VITALS
OXYGEN SATURATION: 98 % | WEIGHT: 253.31 LBS | DIASTOLIC BLOOD PRESSURE: 84 MMHG | SYSTOLIC BLOOD PRESSURE: 134 MMHG | HEIGHT: 70 IN | TEMPERATURE: 97.5 F | HEART RATE: 89 BPM | BODY MASS INDEX: 36.26 KG/M2

## 2022-07-25 DIAGNOSIS — R73.09 ELEVATED GLUCOSE LEVEL: ICD-10-CM

## 2022-07-25 DIAGNOSIS — R05.8 COUGH PRODUCTIVE OF PURULENT SPUTUM: ICD-10-CM

## 2022-07-25 DIAGNOSIS — R07.9 CHEST PAIN, UNSPECIFIED TYPE: ICD-10-CM

## 2022-07-25 PROCEDURE — 99214 OFFICE O/P EST MOD 30 MIN: CPT | Performed by: STUDENT IN AN ORGANIZED HEALTH CARE EDUCATION/TRAINING PROGRAM

## 2022-07-25 PROCEDURE — 93000 ELECTROCARDIOGRAM COMPLETE: CPT | Performed by: STUDENT IN AN ORGANIZED HEALTH CARE EDUCATION/TRAINING PROGRAM

## 2022-07-25 RX ORDER — BENZONATATE 100 MG/1
CAPSULE ORAL
COMMUNITY
Start: 2022-05-10 | End: 2022-07-25

## 2022-07-25 RX ORDER — PREDNISONE 20 MG/1
TABLET ORAL
COMMUNITY
Start: 2022-05-10 | End: 2022-07-25

## 2022-07-25 ASSESSMENT — PATIENT HEALTH QUESTIONNAIRE - PHQ9: CLINICAL INTERPRETATION OF PHQ2 SCORE: 0

## 2022-07-25 NOTE — PROGRESS NOTES
"Subjective:     CC: Fine    HPI:   Dean PHILLIPS presents today with    Cough/ Phlegm  Patient presents today for concern about his sputum.  Patient notes that ever since he got COVID over a month ago he has had dark speckles in his sputum.  Patient was treated by PCP for purulent sputum after COVID with Medrol Dosepak and Z-Dustin.  Patient notes no improvement after this treatment.  Patient notes that cough and sputum production has decreased but concerned that it continues to be discolored/speckled.  Patient also notes that he feels his voice has been hoarse and that he has had increased shortness of breath.  Patient does snore and occasionally wakes himself up gasping for air.  Patient notes no tobacco use but does smoke marijuana daily.  Patient denies caffeine, alcohol, spicy foods, heartburn.  Patient has not tried any other treatments.  Patient notes no triggers and states that symptoms have been constant.  Patient does note some chest tightness that is worse with exertion.      Pseudotumor cerebri  Patient previously following with neurology.    Obesity  Patient's BMI elevated at 36.    Elevated glucose  Patient with strong family history of diabetes.      ROS:  Gen: no fevers/chills  Pulm: no sob, no cough  CV: no chest pain, no palpitations  GI: no nausea/vomiting, no diarrhea        Objective:     Exam:  /84   Pulse 89   Temp 36.4 °C (97.5 °F) (Temporal)   Ht 1.778 m (5' 10\")   Wt 115 kg (253 lb 4.9 oz)   SpO2 98%   BMI 36.35 kg/m²  Body mass index is 36.35 kg/m².    Gen: Alert and oriented, No apparent distress.  Neck: Neck is supple without lymphadenopathy.  Lungs: Normal effort, CTA bilaterally, no wheezes, rhonchi, or rales  CV: Regular rate and rhythm. No murmurs, rubs, or gallops.  Ext: No clubbing, cyanosis, edema.    EKG Interpretation   Ordered and interpreted by Cheryl Friedman PA-C  Rhythm: normal sinus   Rate: 58 cam   Axis: left axis deviation  Ectopy: none   Conduction: normal   ST " Segments: no acute change   T Waves: no acute change   Q Waves: none   Clinical Impression: no acute changes and normal EKG        Assessment & Plan:     41 y.o. male with the following -     1. Cough productive of purulent sputum  Acute, stable.  Patient notes dark speckled sputum x1 month.  Patient notes some shortness of breath and nonspecific chest pain.  EKG in office normal.  Vital signs normal.  Lungs clear to auscultation bilaterally.  No significant sinus tenderness or pressure.  Patient is smoking daily, patient encouraged to reduced marijuana use.  We will get labs to further evaluate.  We will get chest x-ray for further evaluation.  Patient encouraged to increase water intake.  Jayjay signs and symptoms that would warrant emergent evaluation.  Patient advised to follow-up.  - CBC WITHOUT DIFFERENTIAL; Future  - Comp Metabolic Panel; Future  - Lipid Profile; Future  - MICROALBUMIN CREAT RATIO URINE; Future  - TSH; Future  - HEMOGLOBIN A1C; Future  - VITAMIN D,25 HYDROXY; Future    2. Chest pain, unspecified type  EKG completed in office today with no significant findings.  Discussed with patient signs and symptoms of cardiovascular event.  Patient advised to go to the ED for further evaluation if having symptoms and that that cannot be ruled out with EKG or in the clinic today.  - EKG - Clinic Performed  - DX-CHEST-2 VIEWS; Future    3. Elevated glucose level  - MICROALBUMIN CREAT RATIO URINE; Future  - HEMOGLOBIN A1C; Future      Return in about 4 weeks (around 8/22/2022), or if symptoms worsen or fail to improve.    Please note that this dictation was created using voice recognition software. I have made every reasonable attempt to correct obvious errors, but I expect that there are errors of grammar and possibly content that I did not discover before finalizing the note.

## 2022-08-18 ENCOUNTER — HOSPITAL ENCOUNTER (OUTPATIENT)
Dept: LAB | Facility: MEDICAL CENTER | Age: 42
End: 2022-08-18
Attending: STUDENT IN AN ORGANIZED HEALTH CARE EDUCATION/TRAINING PROGRAM
Payer: COMMERCIAL

## 2022-08-18 ENCOUNTER — HOSPITAL ENCOUNTER (OUTPATIENT)
Dept: RADIOLOGY | Facility: MEDICAL CENTER | Age: 42
End: 2022-08-18
Attending: STUDENT IN AN ORGANIZED HEALTH CARE EDUCATION/TRAINING PROGRAM
Payer: COMMERCIAL

## 2022-08-18 DIAGNOSIS — R05.8 COUGH PRODUCTIVE OF PURULENT SPUTUM: ICD-10-CM

## 2022-08-18 DIAGNOSIS — R73.09 ELEVATED GLUCOSE LEVEL: ICD-10-CM

## 2022-08-18 DIAGNOSIS — R07.9 CHEST PAIN, UNSPECIFIED TYPE: ICD-10-CM

## 2022-08-18 LAB
25(OH)D3 SERPL-MCNC: 14 NG/ML (ref 30–100)
ALBUMIN SERPL BCP-MCNC: 4.7 G/DL (ref 3.2–4.9)
ALBUMIN/GLOB SERPL: 1.5 G/DL
ALP SERPL-CCNC: 81 U/L (ref 30–99)
ALT SERPL-CCNC: 38 U/L (ref 2–50)
ANION GAP SERPL CALC-SCNC: 9 MMOL/L (ref 7–16)
AST SERPL-CCNC: 26 U/L (ref 12–45)
BILIRUB SERPL-MCNC: 0.3 MG/DL (ref 0.1–1.5)
BUN SERPL-MCNC: 15 MG/DL (ref 8–22)
CALCIUM SERPL-MCNC: 9.5 MG/DL (ref 8.5–10.5)
CHLORIDE SERPL-SCNC: 104 MMOL/L (ref 96–112)
CHOLEST SERPL-MCNC: 226 MG/DL (ref 100–199)
CO2 SERPL-SCNC: 26 MMOL/L (ref 20–33)
CREAT SERPL-MCNC: 1.02 MG/DL (ref 0.5–1.4)
CREAT UR-MCNC: 163.58 MG/DL
ERYTHROCYTE [DISTWIDTH] IN BLOOD BY AUTOMATED COUNT: 44.2 FL (ref 35.9–50)
EST. AVERAGE GLUCOSE BLD GHB EST-MCNC: 126 MG/DL
GFR SERPLBLD CREATININE-BSD FMLA CKD-EPI: 95 ML/MIN/1.73 M 2
GLOBULIN SER CALC-MCNC: 3.1 G/DL (ref 1.9–3.5)
GLUCOSE SERPL-MCNC: 100 MG/DL (ref 65–99)
HBA1C MFR BLD: 6 % (ref 4–5.6)
HCT VFR BLD AUTO: 46.9 % (ref 42–52)
HDLC SERPL-MCNC: 57 MG/DL
HGB BLD-MCNC: 15.4 G/DL (ref 14–18)
LDLC SERPL CALC-MCNC: 148 MG/DL
MCH RBC QN AUTO: 30.3 PG (ref 27–33)
MCHC RBC AUTO-ENTMCNC: 32.8 G/DL (ref 33.7–35.3)
MCV RBC AUTO: 92.3 FL (ref 81.4–97.8)
MICROALBUMIN UR-MCNC: <1.2 MG/DL
MICROALBUMIN/CREAT UR: NORMAL MG/G (ref 0–30)
PLATELET # BLD AUTO: 308 K/UL (ref 164–446)
PMV BLD AUTO: 9.2 FL (ref 9–12.9)
POTASSIUM SERPL-SCNC: 4.3 MMOL/L (ref 3.6–5.5)
PROT SERPL-MCNC: 7.8 G/DL (ref 6–8.2)
RBC # BLD AUTO: 5.08 M/UL (ref 4.7–6.1)
SODIUM SERPL-SCNC: 139 MMOL/L (ref 135–145)
TRIGL SERPL-MCNC: 107 MG/DL (ref 0–149)
TSH SERPL DL<=0.005 MIU/L-ACNC: 1.19 UIU/ML (ref 0.38–5.33)
WBC # BLD AUTO: 10.6 K/UL (ref 4.8–10.8)

## 2022-08-18 PROCEDURE — 85027 COMPLETE CBC AUTOMATED: CPT

## 2022-08-18 PROCEDURE — 80061 LIPID PANEL: CPT

## 2022-08-18 PROCEDURE — 84443 ASSAY THYROID STIM HORMONE: CPT

## 2022-08-18 PROCEDURE — 71046 X-RAY EXAM CHEST 2 VIEWS: CPT

## 2022-08-18 PROCEDURE — 83036 HEMOGLOBIN GLYCOSYLATED A1C: CPT

## 2022-08-18 PROCEDURE — 82570 ASSAY OF URINE CREATININE: CPT

## 2022-08-18 PROCEDURE — 36415 COLL VENOUS BLD VENIPUNCTURE: CPT

## 2022-08-18 PROCEDURE — 82043 UR ALBUMIN QUANTITATIVE: CPT

## 2022-08-18 PROCEDURE — 82306 VITAMIN D 25 HYDROXY: CPT

## 2022-08-18 PROCEDURE — 80053 COMPREHEN METABOLIC PANEL: CPT

## 2022-08-23 ENCOUNTER — OFFICE VISIT (OUTPATIENT)
Dept: MEDICAL GROUP | Facility: MEDICAL CENTER | Age: 42
End: 2022-08-23
Payer: COMMERCIAL

## 2022-08-23 VITALS
BODY MASS INDEX: 38.39 KG/M2 | DIASTOLIC BLOOD PRESSURE: 78 MMHG | TEMPERATURE: 98.4 F | HEIGHT: 68 IN | OXYGEN SATURATION: 95 % | SYSTOLIC BLOOD PRESSURE: 126 MMHG | HEART RATE: 56 BPM | WEIGHT: 253.31 LBS

## 2022-08-23 DIAGNOSIS — R04.2 COUGH WITH HEMOPTYSIS: ICD-10-CM

## 2022-08-23 DIAGNOSIS — R73.03 PREDIABETES: ICD-10-CM

## 2022-08-23 DIAGNOSIS — D76.3 ROSAI-DORFMAN DISEASE (HCC): ICD-10-CM

## 2022-08-23 DIAGNOSIS — I51.7 LEFT VENTRICULAR HYPERTROPHY BY ELECTROCARDIOGRAM: ICD-10-CM

## 2022-08-23 DIAGNOSIS — E66.9 OBESITY, CLASS II, BMI 35-39.9: ICD-10-CM

## 2022-08-23 DIAGNOSIS — E55.9 VITAMIN D DEFICIENCY: ICD-10-CM

## 2022-08-23 DIAGNOSIS — E74.39 GLUCOSE INTOLERANCE: ICD-10-CM

## 2022-08-23 DIAGNOSIS — J01.00 ACUTE NON-RECURRENT MAXILLARY SINUSITIS: ICD-10-CM

## 2022-08-23 DIAGNOSIS — E78.5 DYSLIPIDEMIA: ICD-10-CM

## 2022-08-23 PROBLEM — E66.812 OBESITY, CLASS II, BMI 35-39.9: Status: ACTIVE | Noted: 2022-08-23

## 2022-08-23 PROCEDURE — 99214 OFFICE O/P EST MOD 30 MIN: CPT | Performed by: FAMILY MEDICINE

## 2022-08-23 RX ORDER — AMOXICILLIN 875 MG/1
875 TABLET, COATED ORAL 2 TIMES DAILY
Qty: 14 TABLET | Refills: 0 | Status: SHIPPED | OUTPATIENT
Start: 2022-08-23 | End: 2022-08-30

## 2022-08-23 ASSESSMENT — FIBROSIS 4 INDEX: FIB4 SCORE: 0.56

## 2022-08-23 NOTE — PROGRESS NOTES
Chief Complaint   Patient presents with    Cough     Pt states coughing dark phlegm since COVID.     Heart Problem     Pt stated chest X-ray post COVID showed enlarging of heart. EKG performed in office with Cheryl Friedman showed possible enlargement of heart as well, per pt.    Weight Gain     Pt states he has gained weight over the last few years.        Subjective:     HPI:   Dean Mcmullen presents today with the followin. Left ventricular hypertrophy by electrocardiogram  The EKG appears to show LVH.  It is otherwise normal.  CXR findings do not clearly show hypertrophy, there is some question of hypoinflation.  However, patient does have history of murmur in childhood.  Echocardiogram order is discussed and placed.      2. Glucose intolerance/Prediabetes  Patient's recent A1c was 6.0%.  This is clearly prediabetes.  His fasting blood sugar was mildly elevated.  Discussed dietary changes especially sodas and similar sugary drinks.  He has a very strong family history of diabetes.  Healing well make the changes and have follow-up labs in 4 months.    3. Dyslipidemia  Patient denies chest pain, chest pressure, palpitations or exertional shortness of breath. Patient is not on lipid-lowering medication and would prefer to avoid.  Cholesterol is moderately elevated with good HDL and a reasonable but not ideal risk ratio.. Patient is a former smoker. Patient takes no aspirin daily. Patient has no history of myocardial infarction, stroke or PVD.  Diet changes are discussed in detail.  Follow-up lab orders discussed and placed.    4. Cough with hemoptysis/acute maxillary sinutitis  Patient has had a productive cough with brown phlegm and occasional bright blood for the last 3 to 4 weeks.  Patient does have sinus pressure bilaterally and at the nasal mucosa is erythematous and moderately edematous.  The oropharynx is somewhat dry but also looks edematous and a little irritated.  Antibiotic prescription  "discussed and placed.      5. Vitamin D deficiency  Patient does have significant vitamin D deficiency.  I have asked him to start supplementing with vitamin D every day.  Follow-up lab order discussed and placed.    6 Rosai-Tea disease (HCC)  This appears to be quiet at this time, no more problems.    7. Obesity, Class II, BMI 35-39.9  Patient does have significant obesity and he is aware that he is gaining a little too much weight.  He is extremely strong and works a very active job so some of his weight is muscle mass but he is still definitely overweight.        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 14 Tablet 0     No current facility-administered medications for this visit.       No Known Allergies    ROS: As per HPI       Objective:     /78 (BP Location: Right arm, Patient Position: Sitting, BP Cuff Size: Large adult)   Pulse (!) 56   Temp 36.9 °C (98.4 °F) (Temporal)   Ht 1.727 m (5' 8\")   Wt 115 kg (253 lb 4.9 oz)   SpO2 95%  Body mass index is 38.52 kg/m².    Physical Exam:  Constitutional: Well-developed and well-nourished. Not diaphoretic. No distress. Lucid and fluent.  Patient, spouse, physician and staff all wearing masks.  Mask removed briefly for examination.  Skin: Skin is warm and dry. No rash noted.  Head: Atraumatic without lesions.  Eyes: Conjunctivae and extraocular motions are normal. Pupils are equal, round, and " reactive to light. No scleral icterus.   Ears:  External ears unremarkable.   Nose: Nares patent. Mucosa with edema and erythema. No discharge. Bilateral maxillary facial tenderness.  Mouth/Throat: Tongue normal. Oropharynx is clear, a little dry. Posterior pharynx with streaky erythema no exudates.  Neck: Supple, trachea midline. No thyromegaly present. No cervical or supraclavicular lymphadenopathy. No JVD or carotid bruits appreciated  Cardiovascular: Regular rate and rhythm.  Normal S1, S2 without murmur appreciated.  Chest: Effort normal. Clear to auscultation throughout. No adventitious sounds.   Abdomen: Soft, non tender, and without distention. Active bowel sounds in all four quadrants. No rebound, guarding, masses or hepatosplenomegaly.  Extremities: No cyanosis, clubbing, erythema, nor edema.   Neurological: Alert and oriented x 3. No tremor, movements symmetric.  Psychiatric:  Behavior, mood, and affect are appropriate.    EKG shows LVH, CXR is equivocal     Assessment and Plan:     42 y.o. male with the following issues:    1. Left ventricular hypertrophy by electrocardiogram  EC-ECHOCARDIOGRAM COMPLETE W/O CONT      2. Glucose intolerance  HEMOGLOBIN A1C      3. Prediabetes  HEMOGLOBIN A1C      4. Dyslipidemia  Comp Metabolic Panel    Lipid Profile      5. Cough with hemoptysis  CBC WITH DIFFERENTIAL      6. Acute non-recurrent maxillary sinusitis  amoxicillin (AMOXIL) 875 MG tablet      7. Vitamin D deficiency  VITAMIN D,25 HYDROXY      8. Rosai-Tea disease (HCC)        9. Obesity, Class II, BMI 35-39.9              Followup: Return in about 4 months (around 12/23/2022), or if symptoms worsen or fail to improve.

## 2022-11-01 DIAGNOSIS — I51.7 VENTRICULAR HYPERTROPHY: ICD-10-CM

## 2022-11-08 ENCOUNTER — TELEPHONE (OUTPATIENT)
Dept: HEALTH INFORMATION MANAGEMENT | Facility: OTHER | Age: 42
End: 2022-11-08
Payer: COMMERCIAL

## 2023-02-02 ENCOUNTER — TELEPHONE (OUTPATIENT)
Dept: HEALTH INFORMATION MANAGEMENT | Facility: OTHER | Age: 43
End: 2023-02-02
Payer: COMMERCIAL

## 2023-03-06 ENCOUNTER — TELEPHONE (OUTPATIENT)
Dept: CARDIOLOGY | Facility: MEDICAL CENTER | Age: 43
End: 2023-03-06
Payer: COMMERCIAL

## 2023-03-06 NOTE — TELEPHONE ENCOUNTER
Spoke to patient in regards to records for NP appointment with Dr. TA. Patient has never been treated by a cardiologist, all recent records in epic including blood work, cardiac testing, and EKG. Confirmed appt date, time and location.   Will be requesting chest x-ray from [facility].

## 2023-03-13 ENCOUNTER — OFFICE VISIT (OUTPATIENT)
Dept: CARDIOLOGY | Facility: MEDICAL CENTER | Age: 43
End: 2023-03-13
Attending: FAMILY MEDICINE
Payer: COMMERCIAL

## 2023-03-13 VITALS
HEIGHT: 68 IN | OXYGEN SATURATION: 97 % | DIASTOLIC BLOOD PRESSURE: 82 MMHG | HEART RATE: 59 BPM | WEIGHT: 264 LBS | RESPIRATION RATE: 12 BRPM | BODY MASS INDEX: 40.01 KG/M2 | SYSTOLIC BLOOD PRESSURE: 130 MMHG

## 2023-03-13 DIAGNOSIS — R07.89 OTHER CHEST PAIN: ICD-10-CM

## 2023-03-13 DIAGNOSIS — G47.9 SLEEP DISTURBANCE: ICD-10-CM

## 2023-03-13 DIAGNOSIS — I51.7 VENTRICULAR HYPERTROPHY: ICD-10-CM

## 2023-03-13 DIAGNOSIS — R94.31 ABNORMAL EKG: ICD-10-CM

## 2023-03-13 LAB — EKG IMPRESSION: NORMAL

## 2023-03-13 PROCEDURE — 99204 OFFICE O/P NEW MOD 45 MIN: CPT | Performed by: INTERNAL MEDICINE

## 2023-03-13 PROCEDURE — 93000 ELECTROCARDIOGRAM COMPLETE: CPT | Performed by: INTERNAL MEDICINE

## 2023-03-13 ASSESSMENT — ENCOUNTER SYMPTOMS
HEADACHES: 1
ORTHOPNEA: 0
PALPITATIONS: 0
CHILLS: 0
DIZZINESS: 0
ABDOMINAL PAIN: 0
LOSS OF CONSCIOUSNESS: 0
BACK PAIN: 1
FEVER: 0
NECK PAIN: 1
MYALGIAS: 0
BLURRED VISION: 0
SHORTNESS OF BREATH: 0
PND: 0
INSOMNIA: 1

## 2023-03-13 ASSESSMENT — FIBROSIS 4 INDEX: FIB4 SCORE: 0.56

## 2023-03-13 NOTE — PROGRESS NOTES
"Chief Complaint   Patient presents with    New Patient     N/P Dx: Ventricular hypertrophy       Subjective     Dean Mcmullen is a 42 y.o. male who presents today referred by his PCP Salome Tierney MD for cardiology consultation regarding concern for EKG showing left ventricular hypertrophy and an echocardiogram showing mild left ventricular hypertrophy.    The patient states he was diagnosed with a heart murmur at age 7 but had no limitations during his subsequent teenage or adult years and it apparently has not been noted since.  He reports some right-sided lateral nonexertional chest pain that comes and goes.  He has no history of hypertension or dyslipidemia.  He has \"prediabetes\".  He has had some sleep disturbance problem with significant snoring confirmed by his wife along with daytime fatigue and not feeling rested in the morning.  He also reports having a difficult time losing weight despite making efforts to do so.    On 7/25/2022 he had an EKG that showed sinus bradycardia, rate 54, voltage criteria for left  Ventricular hypertrophy with nonspecific ST-T wave abnormalities.  EKGs in 2010 showed sinus bradycardia rate 59 with similar nonspecific ST-T wave abnormalities but no voltage criteria for left ventricular hypertrophy.  An echocardiogram done at [facility] on 9/25/2022 showed normal left ventricular systolic function with a normal left ventricular ejection fraction of 65%, normal diastolic function, mild concentric left hypertrophy, dilated right ventricle with normal right ventricular function and no valvular abnormality.    The patient is physically active.  He works as a  and does a lot of walking and carrying items.  He denies any shortness of breath or chest pain symptoms.    Family history significant for father dying at age 65 of kidney failure complications related to diabetes mellitus.  Mother alive at age 64.    History of salivary gland removal (right side) and pseudotumor " cerebri previously followed by Quintin Matias MD neuro-ophthalmologist currently having some mild headaches.    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     Past Surgical History:   Procedure Laterality Date    APPENDECTOMY      LYMPH NODE SAMPLING       Family History   Problem Relation Age of Onset    Diabetes Father         associated renal failure    Cancer Maternal Grandmother 55        colon    Cancer Paternal Grandfather         lung    Diabetes Paternal Grandfather     Diabetes Paternal Aunt     Diabetes Paternal Uncle     Diabetes Paternal Grandmother      Social History     Socioeconomic History    Marital status: Single     Spouse name: Not on file    Number of children: Not on file    Years of education: Not on file    Highest education level: Not on file   Occupational History    Occupation: , package     Employer: [employer]   Tobacco Use    Smoking status: Former     Types: Cigarettes     Quit date: 2018     Years since quittin.6    Smokeless tobacco: Never   Vaping Use    Vaping Use: Never used   Substance and Sexual Activity    Alcohol use: No    Drug use: Yes     Frequency: 7.0 times per week     Types: Marijuana    Sexual activity: Yes     Partners: Female   Other Topics Concern    Not on file   Social History Narrative    Not on file     Social Determinants of Health     Financial Resource Strain: Not on file   Food Insecurity: Not on file   Transportation Needs: Not on file   Physical Activity: Not on file   Stress: Not on file   Social Connections: Not on file   Intimate Partner Violence: Not on file   Housing Stability: Not on file     No Known  "Allergies  No outpatient encounter medications on file as of 3/13/2023.     No facility-administered encounter medications on file as of 3/13/2023.     Review of Systems   Constitutional:  Negative for chills and fever.   HENT:  Negative for congestion.    Eyes:  Negative for blurred vision.   Respiratory:  Negative for shortness of breath.    Cardiovascular:  Positive for chest pain. Negative for palpitations, orthopnea, leg swelling and PND.   Gastrointestinal:  Negative for abdominal pain.   Genitourinary:  Negative for dysuria.   Musculoskeletal:  Positive for back pain, joint pain and neck pain. Negative for myalgias.   Skin:  Negative for rash.   Neurological:  Positive for headaches. Negative for dizziness and loss of consciousness.   Psychiatric/Behavioral:  The patient has insomnia.             Objective     /82 (BP Location: Left arm, Patient Position: Sitting, BP Cuff Size: Adult)   Pulse (!) 59   Resp 12   Ht 1.727 m (5' 8\")   Wt 120 kg (264 lb)   SpO2 97%   BMI 40.14 kg/m²     Physical Exam  Vitals reviewed.   Constitutional:       General: He is not in acute distress.     Appearance: He is well-developed.   Eyes:      Conjunctiva/sclera: Conjunctivae normal.      Pupils: Pupils are equal, round, and reactive to light.   Neck:      Vascular: No JVD.   Cardiovascular:      Rate and Rhythm: Normal rate and regular rhythm.      Pulses:           Carotid pulses are 2+ on the right side and 2+ on the left side.     Heart sounds: Normal heart sounds. No murmur heard.    No friction rub. No gallop.   Pulmonary:      Effort: Pulmonary effort is normal. No accessory muscle usage or respiratory distress.      Breath sounds: Normal breath sounds. No wheezing or rales.      Comments: Protuberant  Chest:      Comments: Increased AP diameter  Abdominal:      General: There is no distension.      Palpations: Abdomen is soft. There is no mass.      Tenderness: There is no abdominal tenderness. "   Musculoskeletal:      Cervical back: Normal range of motion and neck supple.      Right lower leg: No edema.      Left lower leg: No edema.   Skin:     General: Skin is warm and dry.      Findings: No rash.      Nails: There is no clubbing.   Neurological:      Mental Status: He is alert and oriented to person, place, and time.   Psychiatric:         Behavior: Behavior normal.            EKG 3/13/2023 sinus bradycardia, rate 56, inferior Q waves, nonspecific ST-T wave abnormalities, compared to prior 7/25/2022 EKG tracing left ventricular hypertrophy is no longer evidence with new inferior Q waves.    Assessment & Plan     1. Ventricular hypertrophy  EKG      2. Other chest pain  NM-CARDIAC TREADMILL ONLY-NO IMAGING      3. Abnormal EKG  NM-CARDIAC TREADMILL ONLY-NO IMAGING      4. Sleep disturbance  Referral to Pulmonary and Sleep Medicine          Medical Decision Making: Today's Assessment/Status/Plan:   The patient currently has a normal clinical cardiovascular examination with the exception of chest pain symptoms that are probably noncardiac.  EKG today shows no evidence of left ventricular hypertrophy, the discrepancy is unclear though may lead position.  Echocardiogram reportedly shows mild concentric left ventricular hypertrophy, this was done at [facility] and I do not have the images available to determine whether this was a correct interpretation.  Nonetheless I suspect the patient may have some of hypertension to explain the left ventricular hypertrophy findings.  I recommended to the patient and his wife that they monitor his blood pressure and maintain a blood pressure log.    The patient has risk factors for coronary disease with A1c of 6.0% and LDL of 148 and would recommend a standard treadmill stress test that would also evaluate his blood pressure response to exercise  He clearly has symptoms concerning for sleep apnea and has been referred for formal polysomnography evaluation.  Further  recommendations will be based on the results of his treadmill stress test.  Regarding weight management, help continue to pursue dietary efforts for weight loss, may require additional intervention depending on the results of his efforts and on the sleep study.    Thank you for allowing me to see this patient in consultation, if you have any questions please call

## 2023-04-10 ENCOUNTER — APPOINTMENT (OUTPATIENT)
Dept: MEDICAL GROUP | Facility: MEDICAL CENTER | Age: 43
End: 2023-04-10

## 2023-05-03 ENCOUNTER — APPOINTMENT (OUTPATIENT)
Dept: MEDICAL GROUP | Facility: MEDICAL CENTER | Age: 43
End: 2023-05-03

## 2023-10-05 ENCOUNTER — OFFICE VISIT (OUTPATIENT)
Dept: URGENT CARE | Facility: PHYSICIAN GROUP | Age: 43
End: 2023-10-05

## 2023-10-05 VITALS
RESPIRATION RATE: 12 BRPM | HEART RATE: 53 BPM | SYSTOLIC BLOOD PRESSURE: 128 MMHG | DIASTOLIC BLOOD PRESSURE: 88 MMHG | BODY MASS INDEX: 39.56 KG/M2 | HEIGHT: 68 IN | TEMPERATURE: 97.3 F | WEIGHT: 261 LBS | OXYGEN SATURATION: 96 %

## 2023-10-05 DIAGNOSIS — Z02.89 ENCOUNTER FOR EXAMINATION REQUIRED BY DEPARTMENT OF TRANSPORTATION (DOT): ICD-10-CM

## 2023-10-05 DIAGNOSIS — R31.9 HEMATURIA, UNSPECIFIED TYPE: ICD-10-CM

## 2023-10-05 DIAGNOSIS — R80.9 PROTEINURIA, UNSPECIFIED TYPE: ICD-10-CM

## 2023-10-05 PROCEDURE — 7100 PR DOT PHYSICAL: Performed by: NURSE PRACTITIONER

## 2023-10-05 ASSESSMENT — FIBROSIS 4 INDEX: FIB4 SCORE: 0.58

## 2023-10-05 NOTE — PROGRESS NOTES
Subjective:     Dean Mcmullen is a 43 y.o. male who presents for Employment Physical (DOT Physical)      Presents for DOT re certification.         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       Past Surgical History:   Procedure Laterality Date    APPENDECTOMY      LYMPH NODE SAMPLING         Social History     Socioeconomic History    Marital status: Single     Spouse name: Not on file    Number of children: Not on file    Years of education: Not on file    Highest education level: Not on file   Occupational History    Occupation: , package     Employer: [employer]   Tobacco Use    Smoking status: Former     Current packs/day: 0.00     Types: Cigarettes     Quit date: 2018     Years since quittin.1    Smokeless tobacco: Never   Vaping Use    Vaping Use: Never used   Substance and Sexual Activity    Alcohol use: No    Drug use: Not Currently     Frequency: 7.0 times per week     Types: Marijuana    Sexual activity: Yes     Partners: Female   Other Topics Concern    Not on file   Social History Narrative    Not on file     Social Determinants of Health     Financial Resource Strain: Not on file   Food Insecurity: Not on file   Transportation Needs: Not on file   Physical Activity: Not on file   Stress: Not on file   Social Connections: Not on file   Intimate Partner Violence: Not on file   Housing Stability: Not on file        Family History   Problem Relation Age of Onset    Diabetes Father         associated renal failure    Cancer Maternal Grandmother 55        colon    Cancer Paternal Grandfather         lung    Diabetes Paternal Grandfather     Diabetes Paternal  "Aunt     Diabetes Paternal Uncle     Diabetes Paternal Grandmother         No Known Allergies    Review of Systems   All other systems reviewed and are negative.       Objective:   /88 (BP Location: Left arm, Patient Position: Sitting, BP Cuff Size: Adult long)   Pulse (!) 53   Temp 36.3 °C (97.3 °F) (Temporal)   Resp 12   Ht 1.727 m (5' 8\")   Wt 118 kg (261 lb)   SpO2 96%   BMI 39.68 kg/m²     Physical Exam  Vitals reviewed.   Constitutional:       General: He is not in acute distress.     Appearance: He is well-developed. He is not toxic-appearing.   HENT:      Head: Normocephalic and atraumatic.      Right Ear: External ear normal.      Left Ear: External ear normal.      Nose: Nose normal.      Mouth/Throat:      Mouth: Mucous membranes are moist.   Eyes:      Extraocular Movements: Extraocular movements intact.      Conjunctiva/sclera: Conjunctivae normal.      Pupils: Pupils are equal, round, and reactive to light.   Neck:      Vascular: No JVD.   Cardiovascular:      Rate and Rhythm: Normal rate and regular rhythm.      Pulses:           Radial pulses are 2+ on the right side and 2+ on the left side.        Femoral pulses are 2+ on the right side and 2+ on the left side.     Heart sounds: Normal heart sounds. No murmur heard.  Pulmonary:      Effort: Pulmonary effort is normal.      Breath sounds: Normal breath sounds.   Abdominal:      General: Abdomen is protuberant. Bowel sounds are normal.      Palpations: There is no mass or pulsatile mass.   Musculoskeletal:         General: Normal range of motion.      Cervical back: Normal range of motion and neck supple.   Skin:     General: Skin is warm and dry.      Findings: No rash.   Neurological:      Mental Status: He is alert and oriented to person, place, and time.      GCS: GCS eye subscore is 4. GCS verbal subscore is 5. GCS motor subscore is 6.      Motor: Motor function is intact. No weakness.      Gait: Gait is intact.      Deep Tendon " Reflexes:      Reflex Scores:       Patellar reflexes are 2+ on the right side and 2+ on the left side.  Psychiatric:         Speech: Speech normal.         Behavior: Behavior normal.         Thought Content: Thought content normal.         Judgment: Judgment normal.         Assessment/Plan:   1. Encounter for examination required by Department of Transportation (DOT)    2. Proteinuria, unspecified type    3. Hematuria, unspecified type    Patient presents for DOT exam. See scanned documentation for physical and health questionnaire. Denies SOB, CP or dizziness on exertion. Denies h/o respiratory or cardiac pathology, or seizures. Reviewed PMPD, no medications listed.    -Advised PCP f/u for protein and hematuria. Review labs from 8/2022, Renal function WNL. HgA1C was 6. No hx of HTN.     Differential diagnosis, natural history, supportive care, and indications for immediate follow-up discussed.

## 2024-03-18 ENCOUNTER — TELEPHONE (OUTPATIENT)
Dept: MEDICAL GROUP | Facility: MEDICAL CENTER | Age: 44
End: 2024-03-18

## 2024-03-18 NOTE — LETTER
March 18, 2024        Patient: Dean Mcmullen   YOB: 1980   Date of Visit: 3/18/2024           To Whom It May Concern:    It is my medical opinion that Dean Mcmullen came in today for a medical visit.  Unfortunately there was a scheduling issue and we had to also schedule him for Wednesday.  Please excuse him for March 18, 2024 and March 20, 2024 due to medical visits.    If you have any questions or concerns, please don't hesitate to call.    Sincerely,      Rahul Tierney M.D.  Electronically Signed    [facility] [address]  [address] [facility] [address]  [facility] [id number] [phone] (Phone)  [phone] (Fax)

## 2024-04-13 ENCOUNTER — OCCUPATIONAL MEDICINE (OUTPATIENT)
Dept: URGENT CARE | Facility: PHYSICIAN GROUP | Age: 44
End: 2024-04-13
Payer: COMMERCIAL

## 2024-04-13 VITALS
OXYGEN SATURATION: 96 % | WEIGHT: 246 LBS | DIASTOLIC BLOOD PRESSURE: 86 MMHG | HEART RATE: 60 BPM | HEIGHT: 68 IN | TEMPERATURE: 98.1 F | SYSTOLIC BLOOD PRESSURE: 124 MMHG | BODY MASS INDEX: 37.28 KG/M2 | RESPIRATION RATE: 14 BRPM

## 2024-04-13 DIAGNOSIS — S46.911A STRAIN OF RIGHT SHOULDER, INITIAL ENCOUNTER: ICD-10-CM

## 2024-04-13 DIAGNOSIS — M25.511 PAIN IN JOINT OF RIGHT SHOULDER: ICD-10-CM

## 2024-04-13 PROCEDURE — 3074F SYST BP LT 130 MM HG: CPT

## 2024-04-13 PROCEDURE — 99213 OFFICE O/P EST LOW 20 MIN: CPT

## 2024-04-13 PROCEDURE — 3079F DIAST BP 80-89 MM HG: CPT

## 2024-04-13 RX ORDER — NAPROXEN 500 MG/1
TABLET ORAL
COMMUNITY
Start: 2024-04-12

## 2024-04-13 RX ORDER — OXYCODONE HYDROCHLORIDE AND ACETAMINOPHEN 5; 325 MG/1; MG/1
TABLET ORAL
COMMUNITY
Start: 2024-04-12

## 2024-04-13 ASSESSMENT — FIBROSIS 4 INDEX: FIB4 SCORE: 0.58

## 2024-04-13 NOTE — LETTER
"    EMPLOYEE’S CLAIM FOR COMPENSATION/ REPORT OF INITIAL TREATMENT  FORM C-4  PLEASE TYPE OR PRINT    EMPLOYEE’S CLAIM - PROVIDE ALL INFORMATION REQUESTED   First Name                    CHARLA Moran Last Name  Benedict Birthdate                    1980                Sex  []M  []F Claim Number (Insurer’s Use Only)     Home Address  [address] Age  43 y.o. Height  1.727 m (5' 8\") Weight  112 kg (246 lb) Social Security Number     [facility] Zip  [address] Telephone  There are no phone numbers on file.   Mailing Address  [address] [facility] Zip  [address] Primary Language Spoken  English    INSURER  [employer]Shelia Osgood THIRD-PARTY   JJ PHARMAjennifer/Lena Cutler   Employee's Occupation (Job Title) When Injury or Occupational Disease Occurred      Employer's Name/Company Name  [employer]  Telephone  [phone]    Office Mail Address (Number and Street)  [address] [facility]     Date of Injury (if applicable) 4/12/2024               Hours Injury (if applicable)  9:00 AM Date Employer Notified  4/12/2024 Last Day of Work after Injury or Occupational Disease  4/12/2024 Supervisor to Whom Injury     Reported  Amadou Shields   Address or Location of Accident (if applicable)  Work [1]   What were you doing at the time of accident? (if applicable)  Pulling Down Back Door of Package Car    How did this injury or occupational disease occur? (Be specific and answer in detail. Use additional sheet if necessary)  When pulling down the door, the door got stuck and pulled my arm and felt imediate pain and burning down my entire arm   If you believe that you have an occupational disease, when did you first have knowledge of the disability and its relationship to your employment?  N/A Witnesses to the Accident (if applicable)  N/A      Nature of Injury or Occupational " Disease  Workers' Compensation  Part(s) of Body Injured or Affected  Shoulder (R) N/A N/A    I CERTIFY THAT THE ABOVE IS TRUE AND CORRECT TO T HE BEST OF MY KNOWLEDGE AND THAT I HAVE PROVIDED THIS INFORMATION IN ORDER TO OBTAIN THE BENEFITS OF [place], OR [place]).  I HEREBY AUTHORIZE ANY PHYSICIAN, CHIROPRACTOR, SURGEON, PRACTITIONER OR ANY OTHER PERSON, ANY HOSPITAL, INCLUDING [facility] OR [facility], ANY  MEDICAL SERVICE ORGANIZATION, ANY INSURANCE COMPANY, OR OTHER INSTITUTION OR ORGANIZATION TO RELEASE TO EACH OTHER, ANY MEDICAL OR OTHER INFORMATION, INCLUDING BENEFITS PAID OR PAYABLE, PERTINENT TO THIS INJURY OR DISEASE, EXCEPT INFORMATION RELATIVE TO DIAGNOSIS, TREATMENT AND/OR COUNSELING FOR AIDS, PSYCHOLOGICAL CONDITIONS, ALCOHOL OR CONTROLLED SUBSTANCES, FOR WHICH I MUST GIVE SPECIFIC AUTHORIZATION.  A PHOTOSTAT OF THIS AUTHORIZATION SHALL BE VALID AS THE ORIGINAL.     Date   Place Employee’s Original or  *Electronic Signature   THIS REPORT MUST BE COMPLETED AND MAILED WITHIN 3 WORKING DAYS OF TREATMENT   Place  [facility] [place] [facility]    Name of Facility  [place]   Date 4/13/2024 Diagnosis and Description of Injury or Occupational Disease  (M25.511) Pain in joint of right shoulder  (S46.911A) Strain of right shoulder, initial encounter  Diagnoses of Pain in joint of right shoulder and Strain of right shoulder, initial encounter were pertinent to this visit. Is there evidence that the injured employee was under the influence of alcohol and/or another controlled substance at the time of accident?  []No  [] Yes (if yes, please explain)   Hour 1:57 PM  No   Treatment: Patient with significant pain and limited ROM related to right shoulder injury. Concern for ligament involvement. Patient has a pending Ortho Surgery referral. No evidence of neurovascular compromise. No red flag/alarm feature  findings present on history or examination.   Continue treatment plan obtained from [facility] including ice/heat application, Percocet, arm elevation, sling, and gentle stretching.   Work restrictions placed. Follow up in 1 week.   Signs and symptoms of frozen shoulder discussed, patient verbalizes understanding of when to present to emergency room or call 911.   Illness progression and alarm symptoms discussed with patient, emphasizing low threshold for returning to clinic/emergency department for worsening symptoms. Patient is agreeable to the plan and verbalizes understanding, and will follow up if warranted.     Have you advised the patient to remain off work five days or more?   [] Yes Indicate dates: From   To    []No If no, is the injured employee capable of: [] full duty [] modified duty                                                             No  Yes  If modified duty, specify any limitations / restrictions:  See D39 for restrictions                                                                                                                                                                                                                                                                                                                                                                                                                X-Ray Findings: Negative    From information given by the employee, together with medical evidence, can you directly connect this injury or occupational disease as job incurred?  []Yes   [] No Yes    Is additional medical care by a physician indicated? []Yes [] No  Yes    Do you know of any previous injury or disease contributing to this condition or occupational disease? []Yes [] No (Explain if yes)                          No   Date  4/13/2024 Print Health Care Provider’s Name  KANDY Quinn. I certify that the employer’s copy of  this form was delivered to the employer on:    Address  [address] INSURER'S USE ONLY                       [facility] Zip  [address] Provider’s Tax ID Number  [id number]   Telephone  Dept: [phone]    Health Care Provider’s Original or Electronic Signature  abimbola-MATEUSZ Marsh Degree (MD,DO, DC,PADIANA,APRN)  APRN  Choose (if applicable)      ORIGINAL - TREATING HEALTHCARE PROVIDER PAGE 2 - INSURER/TPA PAGE 3 - EMPLOYER PAGE 4 - EMPLOYEE             Form C-4 (rev.08/23)     BRIEF DESCRIPTION OF RIGHTS AND BENEFITS  (Pursuant to [place] 616[place].050)    Notice of Injury or Occupational Disease (Incident Report Form C-1): If an injury or occupational disease (OD) arises out of and in the  course of employment, you must provide written notice to your employer as soon as practicable, but no later than 7 days after the accident or  OD. Your employer shall maintain a sufficient supply of the required forms.    Employee’s Claim for Compensation/Report of Initial Treatment (Form C-4): If medical treatment is sought, the Form C-4 is available at  the place of initial treatment. A completed Form C-4 must be filed within 90 days after an accident or OD. The treating physician, chiropractic  physician, physician assistant or advanced practice nurse must, within 3 working days after treatment, complete and mail to the employer, the  employer's insurer and third-party , the Claim for Compensation.    Medical Treatment: If you require medical treatment for your on-the-job injury or OD, you may be required to select a physician or  chiropractic physician from a list provided by your workers’ compensation insurer, if it has contracted with an Organization for Managed Care  (MCO) or Preferred Provider Organization (PPO) or providers of health care. If your employer has not entered into a contract with an MCO or  PPO, you may select a physician or chiropractic physician from the Panel of Physicians and Chiropractic Physicians. Any medical  costs related  to your industrial injury or OD will be paid by your insurer.    Temporary Total Disability (TTD): If your doctor has certified that you are unable to work for a period of at least 5 consecutive days, or 5  cumulative days in a 20-day period, or places restrictions on you that your employer does not accommodate, you may be entitled to TTD  compensation.    Temporary Partial Disability (TPD): If the wage you receive upon reemployment is less than the compensation for TTD to which you are  entitled, the insurer may be required to pay you TPD compensation to make up the difference. TPD can only be paid for a maximum of 24  months.    Permanent Partial Disability (PPD): When your medical condition is stable and there is an indication of a PPD as a result of your injury or  OD, within 30 days, your insurer must arrange for an evaluation by a rating physician or chiropractic physician to determine the degree of your  PPD. The amount of your PPD award depends on the date of injury, the results of the PPD evaluation, your age and wage.    Permanent Total Disability (PTD): If you are medically certified by a treating physician or chiropractic physician as permanently and totally  disabled and have been granted a PTD status by your insurer, you are entitled to receive monthly benefits not to exceed 66 2/3% of your  average monthly wage. The amount of your PTD payments is subject to reduction if you previously received a lump-sum PPD award.    Vocational Rehabilitation Services: You may be eligible for vocational rehabilitation services if you are unable to return to the job due to a  permanent physical impairment or permanent restrictions as a result of your injury or occupational disease.    Transportation and Per Shikha Reimbursement: You may be eligible for travel expenses and per shikha associated with medical treatment.    Reopening: You may be able to reopen your claim if your condition worsens after claim  closure.    Appeal Process: If you disagree with a written determination issued by the insurer or the insurer does not respond to your request, you may  appeal to the Department of Administration, , by following the instructions contained in your determination letter. You must  appeal the determination within 70 days from the date of the determination letter at [address], [address], [place], [place]  [address], or [address] [place], [place], [place] [address]. If you disagree with the  decision, you may appeal to the  Department of Administration, . You must file your appeal within 30 days from the date of the  decision letter  at [address], [address], [place], [place] [address], or [address], [address], [place], [place] [address]. If you  disagree with a decision of an , you may file a petition for judicial review with the District Court. You must do so within 30  days of the ’s decision. You may be represented by an  at your own expense, or you may contact the [facility] for possible  Representation.    [place]  for Injured Workers (NAIW): If you disagree with a  decision, you may request that NAIW represent you  without charge at an  Hearing. For information regarding denial of benefits, you may contact the NA at: [address], [address], [place] [address], [phone], or [address], [place] [address], [phone]    To File a Complaint with the Division: If you wish to file a complaint with the  of the Division of Industrial Relations (DIR),  please contact the Workers’ Compensation Section, [address] [place] [address] [address], [place] [address], telephone [phone]) [phone], or  [address], [place], [place] [address], telephone [phone].    For  AssistancewithWorkers’Compensation Issues: You may contact the [facility] Office for Consumer Health Assistance, [address], [place] [address], Toll Free 1-236.737.5931, Web site:  https://adsd.[place].gov/Programs/CHRIS/Office_for_Consumer_Health_Assistance_(OCHA)/ E-mail: [email or web address]              __________________________________________________________________                                    _________________            Employee Name / Signature                                                                                                                            Date                                                                                                                                                                                                                              D-2 (rev. 02/24)

## 2024-04-13 NOTE — LETTER
PHYSICIAN’S AND CHIROPRACTIC PHYSICIAN'S                       PROGRESS REPORT  CERTIFICATION OF DISABILITY Claim Number:        Social Security Number:     Patient’s Name:Dean Mcmullen Date of Injury:  4/12/2024     Employer:  [employer]  Name of O (if applicable)   Patient’s Job Description/Occupation:      Previous Injuries/Diseases/Surgeries Contributing to the Condition:   None    Diagnosis:  Diagnoses of Pain in joint of right shoulder and Strain of right shoulder, initial encounter were pertinent to this visit.   Related to the Industrial Injury? Explain:  Yes    Objective Medical Findings:  Right upper extremity: Significant reduced shoulder abduction, rotation, and forward flexion related to pain. No sign of impingement in right arm. No right shoulder instability indicating dislocation. No neurovascular compromise to right upper extremity. Cap refill <2 sec. Radial pulse 2+. Full ROM of neck, elbow, and wrist.          []  None - Discharged                         Stable     [x]  Yes     []  No                           Ratable     []  Yes     []  No   []  Generally Improved                        []  Condition Worsened                              []  Condition Same         May Have Suffered a Permanent Disability     []  Yes     []  No   Treatment Plan: Medication  Patient with significant pain and limited ROM related to right shoulder injury. Concern for ligament involvement. Patient has a pending Ortho Surgery referral. No evidence of neurovascular compromise. No red flag/alarm feature findings present on history or examination.   Continue treatment plan obtained from [facility] including ice/heat application, Percocet, arm elevation, sling, and gentle stretching.   Work restrictions placed. Follow up in 1 week.   Signs and symptoms of frozen shoulder discussed, patient verbalizes understanding of when to present to emergency room or call 911.   Illness  progression and alarm symptoms discussed with patient, emphasizing low threshold for returning to clinic/emergency department for worsening symptoms. Patient is agreeable to the plan and verbalizes understanding, and will follow up if warranted.        [] No Change in Therapy                    [] PT/OT Prescribed                   [] Medication May be Used While Working    [] Case Management                          [] PT/OT Discontinued  [x]  Consultation    [] Further Diagnostic Studies    []  Prescription(s)                        [] Released to FULL DUTY /No Restrictions on (Date):                                                                                           [] Certified TOTALLY TEMPORARILY DISABLED (Indicate Dates): From:                       To:                               [x] Released to RESTRICTED/Modified Duty on (Date): From: 4/13/24                             To:   4/20/24                                                                Restrictions Are:     []  Permanent[x]  Temporary   [] No Sitting                   []  No Standing          [x]  No Pulling             [x]  Other: 0hrs/day fine hand manipulations, 0hrs/day pulling, pushing, lifting, carrying with right arm. 0 hrs/day reaching above or below right shoulder                                              [] No Bending at Waist  []  No Stooping          [x]  No Lifting                                                                            [x] No Carrying                []  No Walking           []  Lifting Restricted to (lbs.):< or = to 10 pounds  [] No Pushing                 []  No Climbing         [x]  No Reaching Above Shoulders   Date of Next Visit: 4/20/2024 Date of this Exam:  4/13/2024 Physician/Chiropractic Physician Name: EMELYN Quinn Physician/Chiropractic Physician Signature:   Hal Zepeda DO MPH   D-39 (Rev. 2/24)

## 2024-04-14 ASSESSMENT — VISUAL ACUITY: OU: 1

## 2024-04-15 NOTE — PROGRESS NOTES
"Subjective:     Dean Mcmullen is a 43 y.o. male who presents for Workers Comp right shoulder injury.     HPI:   DOI: 4/12/24. CHERI: Patient reports he was pulling a door down while at work and his arm was rinku resulting in immediate burning pain in the right anterior shoulder radiating down the right arm. Pain is worse with movement. Shoulder flexion and abduction impaired related to pain. He was seen at [facility] Emergency Room yesterday where an x-ray was performed. X-ray reviewed and is without acute fracture or dislocation. He was urgently referred to an Orthopedic specialist for further evaluation, referral pending. Reports slight improvement in ROM since yesterday. He has been taking prescribed Percocet for pain and received a Toradol shot in the hospital. He is wearing a sling. Denies previous injury to right upper extremity. No deformity to right shoulder. No loss of sensation in right upper extremity. Denies neck or back pain. No skin discoloration.     PMH:   No pertinent past medical history to this problem  MEDS:  Medications were reviewed in EMR  ALLERGIES:  Allergies were reviewed in EMR  FH:   No pertinent family history to this problem     Objective:     /86   Pulse 60   Temp 36.7 °C (98.1 °F) (Temporal)   Resp 14   Ht 1.727 m (5' 8\")   Wt 112 kg (246 lb)   SpO2 96%   BMI 37.40 kg/m²     Physical Exam  Vitals reviewed.   Constitutional:       General: He is not in acute distress.  HENT:      Nose: Nose normal.   Eyes:      General: Vision grossly intact. Gaze aligned appropriately.      Extraocular Movements: Extraocular movements intact.      Conjunctiva/sclera: Conjunctivae normal.      Pupils: Pupils are equal, round, and reactive to light.   Cardiovascular:      Rate and Rhythm: Normal rate and regular rhythm.      Pulses: Normal pulses.           Radial pulses are 2+ on the right side and 2+ on the left side.      Heart sounds: Normal heart sounds.   Pulmonary:      Effort: " Pulmonary effort is normal. No tachypnea, accessory muscle usage, prolonged expiration, respiratory distress or retractions.      Breath sounds: Normal breath sounds.   Musculoskeletal:      Right shoulder: Tenderness present. No swelling, deformity, effusion, bony tenderness or crepitus. Decreased range of motion. Decreased strength. Normal pulse.      Left shoulder: Normal.      Right upper arm: Tenderness present. No swelling, edema or bony tenderness.      Left upper arm: Normal.      Right elbow: No swelling, deformity or effusion. Normal range of motion. No tenderness.      Left elbow: Normal.      Cervical back: Full passive range of motion without pain, normal range of motion and neck supple. No rigidity, tenderness or crepitus. No pain with movement. Normal range of motion.   Skin:     General: Skin is warm and dry.      Capillary Refill: Capillary refill takes less than 2 seconds.   Neurological:      Mental Status: He is alert. Mental status is at baseline.   Psychiatric:         Mood and Affect: Mood normal.         Behavior: Behavior normal.         Thought Content: Thought content normal.        Right upper extremity: Significant reduced shoulder abduction, rotation, and forward flexion related to pain. No sign of impingement in right arm. No right shoulder instability indicating dislocation. No neurovascular compromise to right upper extremity. Cap refill <2 sec. Radial pulse 2+. Full ROM of neck, elbow, and wrist.     Assessment/Plan:     1. Pain in joint of right shoulder    2. Strain of right shoulder, initial encounter    Other orders  - oxyCODONE-acetaminophen (PERCOCET) 5-325 MG Tab  - naproxen (NAPROSYN) 500 MG Tab    Released to Restricted Duty FROM 4/13/2024 TO 4/20/2024     Patient with significant pain and limited ROM related to right shoulder injury. Concern for ligament involvement. Patient has a pending Ortho Surgery referral. No evidence of neurovascular compromise. No red flag/alarm  feature findings present on history or examination.   Continue treatment plan obtained from [facility] including ice/heat application, Percocet, arm elevation, sling, and gentle stretching.   Work restrictions placed. Follow up in 1 week.   Signs and symptoms of frozen shoulder discussed, patient verbalizes understanding of when to present to emergency room or call 911.   Illness progression and alarm symptoms discussed with patient, emphasizing low threshold for returning to clinic/emergency department for worsening symptoms. Patient is agreeable to the plan and verbalizes understanding, and will follow up if warranted.       Illness progression and alarm symptoms discussed with patient, emphasizing low threshold for returning to clinic/emergency department for worsening symptoms. Patient is agreeable to the plan and verbalizes understanding, and will follow up if warranted.       Differential diagnosis, natural history, supportive care, and indications for immediate follow-up discussed.     Follow-up as needed if symptoms worsen or fail to improve to PCP, Urgent care or Emergency Room.                 Electronically signed by JON Lockett

## 2024-04-20 ENCOUNTER — OCCUPATIONAL MEDICINE (OUTPATIENT)
Dept: URGENT CARE | Facility: PHYSICIAN GROUP | Age: 44
End: 2024-04-20
Payer: COMMERCIAL

## 2024-04-20 VITALS
DIASTOLIC BLOOD PRESSURE: 90 MMHG | HEART RATE: 66 BPM | HEIGHT: 68 IN | OXYGEN SATURATION: 97 % | WEIGHT: 252 LBS | BODY MASS INDEX: 38.19 KG/M2 | TEMPERATURE: 98.2 F | RESPIRATION RATE: 16 BRPM | SYSTOLIC BLOOD PRESSURE: 126 MMHG

## 2024-04-20 DIAGNOSIS — M25.511 PAIN IN JOINT OF RIGHT SHOULDER: ICD-10-CM

## 2024-04-20 DIAGNOSIS — S46.911A STRAIN OF RIGHT SHOULDER, INITIAL ENCOUNTER: ICD-10-CM

## 2024-04-20 PROCEDURE — 99213 OFFICE O/P EST LOW 20 MIN: CPT

## 2024-04-20 PROCEDURE — 3074F SYST BP LT 130 MM HG: CPT

## 2024-04-20 PROCEDURE — 3080F DIAST BP >= 90 MM HG: CPT

## 2024-04-20 ASSESSMENT — FIBROSIS 4 INDEX: FIB4 SCORE: 0.58

## 2024-04-20 NOTE — PROGRESS NOTES
"Subjective:     Dean Mcmullen is a 43 y.o. male who presents for Work-Related Injury (DOI 4/12/24/R arm injury feels better but tenderness in front of shoulder, ROM improved but not back to normal )      Patient presents for his second WC IV today.  DOI: 0/12/24.  Chief complaint right shoulder pain  Patient reports 25% improvement since his initial evaluation.  He states he is not able to bend at the arm, however lifting his arm at the shoulder, making cross body maneuvers is not possible due to pain.  He endorses weakness.  He denies numbness/tingling sensation.  He no longer experiencing burning sensation down the front of his arm.  He states he has been on vacation and has not returned to work since his accident.  Denies spasms.  Rates pain as a 7-8 out of 10 when he attempts to use his shoulder.  He was unable to sleep on his shoulder for the first few days due to pain. States pain is slightly alleviated with use of arm sling. He states he takes the brace off to perform ROM exercises.  Denies known traumatic injuries involving the right shoulder.  He is right hand dominant.  Denies second job.    PMH:   No pertinent past medical history to this problem  MEDS:  Medications were reviewed in EMR  ALLERGIES:  Allergies were reviewed in EMR  [employer]:  Works as a [employer]   FH:   No pertinent family history to this problem       Objective:     BP (!) 126/90   Pulse 66   Temp 36.8 °C (98.2 °F)   Resp 16   Ht 1.727 m (5' 8\")   Wt 114 kg (252 lb)   SpO2 97%   BMI 38.32 kg/m²     Right anterior shoulder and deltoid muscle are tender to palpation. No deformity, dislocation. No tenderness of cervical spine, elbow. Strength is 4/5. There is reduced ROM secondary to pain. Sensation is intact to light and sharp touch, cap refill is less than 2 seconds, 2+ radial pulse.     Assessment/Plan:       1. Pain in joint of right shoulder    2. Strain of right shoulder, initial encounter    Released to " Restricted Duty FROM 4/20/2024 TO 4/27/2024  D 39 updated today.  He continues to have right shoulder pain with limited ROM. Unable to lift at the shoulder or perform cross body maneuvers.   Work restrictions: No pushing, pulling, lifting, carrying of objects with the right arm. No lifting above right shoulder. No climbing.   Recommendations: rest, shoulder exercises provided (see AVS), application of warm packs, NSAIDs/Tylenol per package instructions. Remove arm sling to perform shoulder exercises to prevent frozen shoulder reinforced.   Follow up: one week.        Differential diagnosis, natural history, supportive care, and indications for immediate follow-up discussed.

## 2024-04-20 NOTE — LETTER
PHYS[facility]IAN[facility] AND [facility]IC PHYS[facility]IAN[facility]                       PROGRESS REPORT  CERTIFICATION OF DISABILITY Claim Number:        Social Security Number:     Patient’s Name:Dean Mcmullen Date of Injury:  4/12/2024     Employer:  [employer]  Name of O (if applicable)   Patient’s Job Description/Occupation:      Previous Injuries/Diseases/Surgeries Contributing to the Condition:   None known   Diagnosis:  (M25.511) Pain in joint of right shoulder  (S46.911A) Strain of right shoulder, initial encounterDiagnoses of Pain in joint of right shoulder and Strain of right shoulder, initial encounter were pertinent to this visit.   Related to the Industrial Injury? Yes      Objective Medical Findings: Right anterior shoulder and deltoid muscle are tender to palpation. No deformity, dislocation. No tenderness of cervical spine, elbow. Strength is 4/5. There is reduced ROM secondary to pain. Sensation is intact to light and sharp touch, cap refill is less than 2 seconds, 2+ radial pulse.     []  None - Discharged                         Stable     [x]  Yes     []  No                           Ratable     [x]  Yes     []  No   []  Generally Improved                        []  Condition Worsened                              [x]  Condition Same         May Have Suffered a Permanent Disability     []  Yes     [x]  No   Treatment Plan: Recommendations: rest, shoulder exercises provided (see AVS), application of warm packs, NSAIDs/Tylenol per package instructions. Remove arm sling to perform shoulder exercises to prevent frozen shoulder reinforced.      Work restrictions: No pushing, pulling, lifting, carrying of objects with the right arm. No lifting above right shoulder. No climbing.      [x] No Change in Therapy                    [] PT/OT Prescribed                   [] Medication May be Used While Working    [] Case Management                          [] PT/OT Discontinued  [x]   Consultation    [] Further Diagnostic Studies    []  Prescription(s) Ortho referral is pending authorization                       [] Released to FULL DUTY /No Restrictions on (Date):                                                                                           [] Certified TOTALLY TEMPORARILY DISABLED (Indicate Dates): From:                       To:                               [x] Released to RESTRICTED/Modified Duty on (Date): From:   04/20/24             To:       04/27/24                                               Restrictions Are:     []  Permanent[x]  Temporary   [] No Sitting                   []  No Standing          [x]  No Pulling             []  Other:                                              [] No Bending at Waist  []  No Stooping          [x]  No Lifting                                                                            [x] No Carrying                []  No Walking           [x]  Lifting Restricted to (lbs.):< or = to 10 pounds  [x] No Pushing                 [x]  No Climbing         [x]  No Reaching Above Shoulders    Work restrictions: No pushing, pulling, lifting, carrying of objects with the right arm. No lifting above right shoulder. No climbing.    Date of Next Visit: 4/27/2024 Date of this Exam:  4/20/2024 Physician/Chiropractic Physician Name: EMELYN Lino Physician/Chiropractic Physician Signature:   Hal Zepeda DO MPH   D-39 (Rev. 2/24)

## 2024-04-23 ENCOUNTER — OCCUPATIONAL MEDICINE (OUTPATIENT)
Dept: URGENT CARE | Facility: PHYSICIAN GROUP | Age: 44
End: 2024-04-23
Payer: COMMERCIAL

## 2024-04-23 VITALS
RESPIRATION RATE: 15 BRPM | HEIGHT: 68 IN | OXYGEN SATURATION: 96 % | BODY MASS INDEX: 37.64 KG/M2 | SYSTOLIC BLOOD PRESSURE: 116 MMHG | WEIGHT: 248.35 LBS | TEMPERATURE: 98.2 F | HEART RATE: 74 BPM | DIASTOLIC BLOOD PRESSURE: 84 MMHG

## 2024-04-23 DIAGNOSIS — S46.911D STRAIN OF RIGHT SHOULDER, SUBSEQUENT ENCOUNTER: ICD-10-CM

## 2024-04-23 PROCEDURE — 99213 OFFICE O/P EST LOW 20 MIN: CPT | Performed by: FAMILY MEDICINE

## 2024-04-23 PROCEDURE — 3074F SYST BP LT 130 MM HG: CPT | Performed by: FAMILY MEDICINE

## 2024-04-23 PROCEDURE — 3079F DIAST BP 80-89 MM HG: CPT | Performed by: FAMILY MEDICINE

## 2024-04-23 ASSESSMENT — ENCOUNTER SYMPTOMS
FOCAL WEAKNESS: 0
BACK PAIN: 0
SENSORY CHANGE: 0

## 2024-04-23 ASSESSMENT — FIBROSIS 4 INDEX: FIB4 SCORE: 0.58

## 2024-04-23 NOTE — LETTER
PHYSICIAN[facility] AND CHIROPRACTIC PHYSICIAN[facility]S                       PROGRESS REPORT  CERTIFICATION OF DISABILITY Claim Number:        Social Security Number:     Patient’s Name:Dean Mcmullen Date of Injury:  4/12/2024     Employer:  [employer] *** Name of [employer] (if applicable)   Patient’s Job Description/Occupation:   ***   Previous Injuries/Diseases/Surgeries Contributing to the Condition:      Diagnosis:  No diagnosis found.There were no encounter diagnoses.   Related to the Industrial Injury? Yes   Explain:[unfilled]   Objective Medical Findings: R shoulder: pain reproduced with external rotation while abducted. No deformity. Distal neuro/vascular intact.       []  None - Discharged                         Stable     []  Yes     []  No                           Ratable     []  Yes     []  No   []  Generally Improved                        []  Condition Worsened                              []  Condition Same         May Have Suffered a Permanent Disability     []  Yes     []  No   Treatment Plan:   OK TO DRIVE COMPANY VEHICLE     [] No Change in Therapy                    [] PT/OT Prescribed                   [] Medication May be Used While Working    [] Case Management                          [] PT/OT Discontinued  []  Consultation    [] Further Diagnostic Studies    []  Prescription(s)                        [] Released to FULL DUTY /No Restrictions on (Date):                                                                                           [] Certified TOTALLY TEMPORARILY DISABLED (Indicate Dates): From:                       To:                               [x] Released to RESTRICTED/Modified Duty on (Date): From:  4/23/24                        To:         4/30/24                                                          Restrictions Are:     []  Permanent[x]  Temporary   [] No Sitting                   []  No Standing          []  No  Pulling             []  Other:                                              [] No Bending at Waist  []  No Stooping          []  No Lifting                                                                            [] No Carrying                []  No Walking           []  Lifting Restricted to (lbs.):  Comments:20#  [] No Pushing                 []  No Climbing         []  No Reaching Above Shoulders<1 hour right   Date of Next Visit: 4/30/2024 Date of this Exam:  4/23/2024 Physician/Chiropractic Physician Name: Rajinder Motta M.D. Physician/Chiropractic Physician Signature:   Hal Zepeda DO MPH   D-39 (Rev. 2/24)

## 2024-04-24 NOTE — PROGRESS NOTES
"Subjective     Dean Kedar Mcmullen is a 43 y.o. male who presents with Shoulder Pain (Worker's comp fv. Needing note to be revised saying that he is physically well enough to operate a motor vehicle. Mobility in RT arm has greatly improved, pain residual. )      DOI: 4/12/2024  F/u visit right shoulder strain. 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 Overall 75% better. Feels restrictions can be advanced to include driving. Continues to have intermittent severe pain with external rotation. Right hand dominant. No prior injury. No second job or outside activity contributing. Continues to use naproxen occas as needed. No other aggravating or alleviating factors.       HPI    Review of Systems   Musculoskeletal:  Negative for back pain.   Neurological:  Negative for sensory change and focal weakness.              Objective     /84 (BP Location: Right arm, Patient Position: Sitting, BP Cuff Size: Adult)   Pulse 74   Temp 36.8 °C (98.2 °F) (Temporal)   Resp 15   Ht 1.727 m (5' 8\") Comment: Pt reported  Wt 113 kg (248 lb 5.6 oz)   SpO2 96%   BMI 37.76 kg/m²      Physical Exam  Musculoskeletal:      Cervical back: Normal range of motion and neck supple.         R shoulder: pain reproduced with external rotation while abducted. No deformity. Distal neuro/vascular intact.                      Assessment & Plan   ER visit 4/12/2024, and urgent care visits 4/13/2024 and 4/20/2024 reviewed    1. Strain of right shoulder, subsequent encounter          Differential diagnosis, natural history, supportive care, and indications for immediate follow-up were discussed.     Advance work restrictions.  Ice and OTC NSAID as needed.    F/u 1 week        "

## 2024-05-03 ENCOUNTER — OCCUPATIONAL MEDICINE (OUTPATIENT)
Dept: URGENT CARE | Facility: PHYSICIAN GROUP | Age: 44
End: 2024-05-03
Payer: COMMERCIAL

## 2024-05-03 VITALS
OXYGEN SATURATION: 98 % | TEMPERATURE: 97.9 F | RESPIRATION RATE: 18 BRPM | WEIGHT: 248.8 LBS | DIASTOLIC BLOOD PRESSURE: 76 MMHG | HEIGHT: 67 IN | SYSTOLIC BLOOD PRESSURE: 118 MMHG | BODY MASS INDEX: 39.05 KG/M2 | HEART RATE: 75 BPM

## 2024-05-03 DIAGNOSIS — S46.911D STRAIN OF RIGHT SHOULDER, SUBSEQUENT ENCOUNTER: ICD-10-CM

## 2024-05-03 PROCEDURE — 3074F SYST BP LT 130 MM HG: CPT | Performed by: NURSE PRACTITIONER

## 2024-05-03 PROCEDURE — 99213 OFFICE O/P EST LOW 20 MIN: CPT | Performed by: NURSE PRACTITIONER

## 2024-05-03 PROCEDURE — 3078F DIAST BP <80 MM HG: CPT | Performed by: NURSE PRACTITIONER

## 2024-05-03 RX ORDER — NAPROXEN 500 MG/1
500 TABLET ORAL 2 TIMES DAILY WITH MEALS
Qty: 60 TABLET | Refills: 0 | Status: SHIPPED | OUTPATIENT
Start: 2024-05-03 | End: 2024-06-02

## 2024-05-03 ASSESSMENT — FIBROSIS 4 INDEX: FIB4 SCORE: 0.58

## 2024-05-03 NOTE — LETTER
PHYSICIAN[facility] AND CHIROPRACTIC PHYSICIAN'S                       PROGRESS REPORT  CERTIFICATION OF DISABILITY Claim Number:        Social Security Number:     Patient’s Name:Dean Mcmullen Date of Injury:  4/12/2024     Employer:  [employer]  Name of O (if applicable)   Patient’s Job Description/Occupation:      Previous Injuries/Diseases/Surgeries Contributing to the Condition:      Diagnosis:  (S46.913A) Strain of right shoulder, subsequent encounterThe encounter diagnosis was Strain of right shoulder, subsequent encounter.   Related to the Industrial Injury?     Explain:   DOI: 4/12/2024    F/V 5/3/2024:  Patient reports that he is doing much better.  He was able to tolerate work restrictions from last visit.   He states he is almost completely back to normal.  Patient reports he would like to go back to work full duty at this time.      Objective Medical Findings: Right shoulder:  Patient has no tenderness to palpation over the right shoulder.  He has full ROM without pain.  Strength and sensation are intact.      []  None - Discharged                         Stable     [x]  Yes     []  No                           Ratable     []  Yes     []  No   [x]  Generally Improved                        []  Condition Worsened                              []  Condition Same         May Have Suffered a Permanent Disability     []  Yes     []  No   Treatment Plan:   1.  Right shoulder strain  Trial of full duty without restrictions  Continue ice, heat and antiinflammatories as needed for pain  Return in one week for reevaluation  Return sooner if worse  Anticipate MMI if full duty trial is successful     [] No Change in Therapy                    [] PT/OT Prescribed                   [] Medication May be Used While Working    [] Case Management                          [] PT/OT Discontinued  []  Consultation    [] Further Diagnostic Studies    []  Prescription(s)                         [x] Released to FULL DUTY /No Restrictions on (Date):              5/3/2024 through 5/10/2024                                                                             [] Certified TOTALLY TEMPORARILY DISABLED (Indicate Dates): From:                       To:                               [] Released to RESTRICTED/Modified Duty on (Date): From:                              To:                                                                   Restrictions Are:     []  Permanent[]  Temporary   [] No Sitting                   []  No Standing          []  No Pulling             []  Other:                                              [] No Bending at Waist  []  No Stooping          []  No Lifting                                                                            [] No Carrying                []  No Walking           []  Lifting Restricted to (lbs.):   [] No Pushing                 []  No Climbing         []  No Reaching Above Shoulders   Date of Next Visit: 5/10/2024 Date of this Exam:  5/3/2024 Physician/Chiropractic Physician Name: EMELYN Worthy Physician/Chiropractic Physician Signature:   Hal Zepeda DO MPH   D-39 (Rev. 2/24)

## 2024-05-03 NOTE — PROGRESS NOTES
"Subjective:     Dean Mcmullen is a 43 y.o. male who presents for Follow-Up (Workers comp )      DOI: 4/12/2024    F/V 5/3/2024:  Patient reports that he is doing much better.  He was able to tolerate work restrictions from last visit.   He states he is almost completely back to normal.  Patient reports he would like to go back to work full duty at this time.     PMH:   No pertinent past medical history to this problem  MEDS:  Medications were reviewed in EMR  ALLERGIES:  Allergies were reviewed in EMR  [employer]:  Works as a   FH:   No pertinent family history to this problem       Objective:     /76   Pulse 75   Temp 36.6 °C (97.9 °F) (Temporal)   Resp 18   Ht 1.71 m (5' 7.32\")   Wt 113 kg (248 lb 12.8 oz)   SpO2 98%   BMI 38.59 kg/m²     Right shoulder:  Patient has no tenderness to palpation over the right shoulder.  He has full ROM without pain.  Strength and sensation are intact.      Assessment/Plan:       1. Strain of right shoulder, subsequent encounter  - naproxen (NAPROSYN) 500 MG Tab; Take 1 Tablet by mouth 2 times a day with meals for 30 days.  Dispense: 60 Tablet; Refill: 0    Released to Full Duty FROM 5/3/2024 TO 5/10/2024  1.  Right shoulder strain  Trial of full duty without restrictions  Continue ice, heat and antiinflammatories as needed for pain  Return in one week for reevaluation  Return sooner if worse  Anticipate MMI if full duty trial is successful       Differential diagnosis, natural history, supportive care, and indications for immediate follow-up discussed.    "

## 2024-05-10 ENCOUNTER — OCCUPATIONAL MEDICINE (OUTPATIENT)
Dept: URGENT CARE | Facility: PHYSICIAN GROUP | Age: 44
End: 2024-05-10
Payer: COMMERCIAL

## 2024-05-10 VITALS
BODY MASS INDEX: 37.44 KG/M2 | WEIGHT: 247 LBS | OXYGEN SATURATION: 95 % | HEART RATE: 86 BPM | DIASTOLIC BLOOD PRESSURE: 80 MMHG | SYSTOLIC BLOOD PRESSURE: 124 MMHG | HEIGHT: 68 IN | TEMPERATURE: 98.7 F | RESPIRATION RATE: 14 BRPM

## 2024-05-10 DIAGNOSIS — S46.911D STRAIN OF RIGHT SHOULDER, SUBSEQUENT ENCOUNTER: ICD-10-CM

## 2024-05-10 PROCEDURE — 3074F SYST BP LT 130 MM HG: CPT | Performed by: NURSE PRACTITIONER

## 2024-05-10 PROCEDURE — 3079F DIAST BP 80-89 MM HG: CPT | Performed by: NURSE PRACTITIONER

## 2024-05-10 PROCEDURE — 99213 OFFICE O/P EST LOW 20 MIN: CPT | Performed by: NURSE PRACTITIONER

## 2024-05-10 ASSESSMENT — FIBROSIS 4 INDEX: FIB4 SCORE: 0.58

## 2024-05-16 NOTE — PROGRESS NOTES
"Subjective:     Dean Mcmullen is a 43 y.o. male who presents for Follow-Up (W/C right shoulder, feeling better )           PMH:   No pertinent past medical history to this problem  MEDS:  Medications were reviewed in EMR  ALLERGIES:  Allergies were reviewed in EMR  [employer]:  Works as a [employer]   FH:   No pertinent family history to this problem       Objective:     /80   Pulse 86   Temp 37.1 °C (98.7 °F) (Temporal)   Resp 14   Ht 1.727 m (5' 8\")   Wt 112 kg (247 lb)   SpO2 95%   BMI 37.56 kg/m²        Right shoulder:  Full ROM of his right shoulder.  No pain to palpation.  Strength and sensation are intact.      Assessment/Plan:       1. Strain of right shoulder, subsequent encounter    Patient is MMI today and released from care.    Differential diagnosis, natural history, supportive care, and indications for immediate follow-up discussed.    "

## 2025-01-07 ENCOUNTER — OFFICE VISIT (OUTPATIENT)
Dept: URGENT CARE | Facility: CLINIC | Age: 45
End: 2025-01-07
Payer: COMMERCIAL

## 2025-01-07 VITALS
HEIGHT: 68 IN | DIASTOLIC BLOOD PRESSURE: 66 MMHG | OXYGEN SATURATION: 96 % | TEMPERATURE: 98 F | HEART RATE: 76 BPM | WEIGHT: 245 LBS | BODY MASS INDEX: 37.13 KG/M2 | SYSTOLIC BLOOD PRESSURE: 112 MMHG | RESPIRATION RATE: 16 BRPM

## 2025-01-07 DIAGNOSIS — J01.10 ACUTE NON-RECURRENT FRONTAL SINUSITIS: ICD-10-CM

## 2025-01-07 DIAGNOSIS — R73.03 PREDIABETES: ICD-10-CM

## 2025-01-07 PROCEDURE — 3074F SYST BP LT 130 MM HG: CPT

## 2025-01-07 PROCEDURE — 3078F DIAST BP <80 MM HG: CPT

## 2025-01-07 PROCEDURE — 99214 OFFICE O/P EST MOD 30 MIN: CPT

## 2025-01-07 RX ORDER — FLUTICASONE PROPIONATE 50 MCG
1 SPRAY, SUSPENSION (ML) NASAL DAILY
Qty: 16 G | Refills: 0 | Status: SHIPPED | OUTPATIENT
Start: 2025-01-07

## 2025-01-07 RX ORDER — BENZONATATE 100 MG/1
100 CAPSULE ORAL 3 TIMES DAILY PRN
Qty: 30 CAPSULE | Refills: 0 | Status: SHIPPED | OUTPATIENT
Start: 2025-01-07

## 2025-01-07 RX ORDER — BROMPHENIRAMINE MALEATE, PSEUDOEPHEDRINE HYDROCHLORIDE, AND DEXTROMETHORPHAN HYDROBROMIDE 2; 30; 10 MG/5ML; MG/5ML; MG/5ML
5 SYRUP ORAL
Qty: 25 ML | Refills: 0 | Status: SHIPPED | OUTPATIENT
Start: 2025-01-07 | End: 2025-01-12

## 2025-01-07 ASSESSMENT — ENCOUNTER SYMPTOMS
ABDOMINAL PAIN: 0
COUGH: 0
CHILLS: 0
FEVER: 0
NAUSEA: 0
SINUS PAIN: 1
VOMITING: 0
SHORTNESS OF BREATH: 0
HEADACHES: 1
SORE THROAT: 0

## 2025-01-07 NOTE — LETTER
[facility]  [address] [id number]     January 7, 2025    Patient: Dean Mcmullen   YOB: 1980   Date of Visit: 1/7/2025       To Whom It May Concern:    Dean Mcmullen was seen and treated in our department on 1/7/2025. Please excuse for any missed work.      Sincerely,     KANDY Leyva.

## 2025-01-08 NOTE — PROGRESS NOTES
Chief Complaint   Patient presents with    Sinus Problem     Believes he has a sinus infection.       HISTORY OF PRESENT ILLNESS: Patient is a pleasant 44 y.o. male who presents to urgent care today that he has been sick since [place], he states he believes he has a sinus infection with ongoing sinus pain and pressure, headaches and runny nose.  He has been taking OTC medications with little to no relief, denies any major chronic medical conditions.    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    Current Outpatient Medications Ordered in Epic   Medication Sig Dispense Refill    fluticasone (FLONASE) 50 MCG/ACT nasal spray Administer 1 Spray into affected nostril(S) every day. 16 g 0    benzonatate (TESSALON) 100 MG Cap Take 1 Capsule by mouth 3 times a day as needed for Cough. 30 Capsule 0    brompheniramine-pseudoephedrine-DM 30-2-10 MG/5ML syrup Take 5 mL by mouth 1 time a day as needed (at night for cough) for up to 5 days. 25 mL 0    amoxicillin-clavulanate (AUGMENTIN) 875-125 MG Tab Take 1 Tablet by mouth 2 times a day for 5 days. 10 Tablet 0    oxyCODONE-acetaminophen (PERCOCET) 5-325 MG Tab  (Patient not taking: Reported on 1/7/2025)       No current [facility]-ordered facility-administered medications on file.       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       Social History     Tobacco Use    Smoking status: Former     Current packs/day: 0.00     Types: Cigarettes     Quit date: 2018     Years since quittin.4    Smokeless tobacco: Never   Vaping Use    Vaping status: Never Used   Substance Use Topics    Alcohol use: No    Drug use: Not Currently     Frequency: 7.0 times per week     Types: Marijuana       Family Status   Relation Name Status    Fa      MGMo  (Not Specified)    PGFa  (Not Specified)    Mo  Alive    PAunt  (Not Specified)    PUnc  (Not Specified)    PGMo  (Not Specified)   No partnership data on file     Family History   Problem Relation Age of Onset    Diabetes Father         associated renal failure    Cancer Maternal Grandmother 55        colon    Cancer Paternal Grandfather         lung    Diabetes Paternal Grandfather     Diabetes Paternal Aunt     Diabetes Paternal Uncle     Diabetes Paternal Grandmother        Review of Systems   Constitutional:  Negative for chills, fever and malaise/fatigue.   HENT:  Positive for congestion and sinus pain. Negative for ear pain and sore throat.    Respiratory:  Negative for cough and shortness of breath.    Gastrointestinal:  Negative for abdominal pain, nausea and vomiting.   Skin:  Negative for rash.   Neurological:  Positive for headaches.       Exam:  /66 (BP Location: Left arm, Patient Position: Sitting, BP Cuff Size: Adult)   Pulse 76   Temp 36.7 °C (98 °F) (Temporal)   Resp 16   Ht 1.727 m (5' 8\")   Wt 111 kg (245 lb)   SpO2 96%   Physical Exam  Vitals reviewed.   Constitutional:       Appearance: Normal appearance. He is obese.   HENT:      Head: Normocephalic.      Right Ear: Tympanic membrane and ear canal normal. There is no impacted cerumen. Tympanic membrane is not injected or erythematous.      Left Ear: Tympanic membrane and ear canal normal. There is no impacted " cerumen. Tympanic membrane is not injected or erythematous.      Nose: Congestion and rhinorrhea present.      Right Turbinates: Enlarged.      Left Turbinates: Enlarged.      Right Sinus: Frontal sinus tenderness present.      Left Sinus: Frontal sinus tenderness present.      Mouth/Throat:      Mouth: Mucous membranes are moist.      Pharynx: Oropharynx is clear. No oropharyngeal exudate.      Tonsils: No tonsillar exudate. 0 on the right. 0 on the left.   Eyes:      General:         Right eye: No discharge.         Left eye: No discharge.      Extraocular Movements: Extraocular movements intact.      Conjunctiva/sclera: Conjunctivae normal.      Pupils: Pupils are equal, round, and reactive to light.   Cardiovascular:      Rate and Rhythm: Normal rate and regular rhythm.      Pulses: Normal pulses.      Heart sounds: Normal heart sounds. No murmur heard.  Pulmonary:      Effort: Pulmonary effort is normal. No respiratory distress.      Breath sounds: Normal breath sounds. No stridor. No wheezing.   Musculoskeletal:         General: Normal range of motion.      Cervical back: Normal range of motion.   Lymphadenopathy:      Cervical: No cervical adenopathy.   Skin:     General: Skin is warm and dry.      Capillary Refill: Capillary refill takes less than 2 seconds.      Findings: No bruising or rash.   Neurological:      General: No focal deficit present.      Mental Status: He is alert.      Sensory: No sensory deficit.      Motor: No weakness.   Psychiatric:         Mood and Affect: Mood normal.         Behavior: Behavior normal.         Thought Content: Thought content normal.         Judgment: Judgment normal.             Assessment/Plan:  1. Acute non-recurrent frontal sinusitis  - fluticasone (FLONASE) 50 MCG/ACT nasal spray; Administer 1 Spray into affected nostril(S) every day.  Dispense: 16 g; Refill: 0  - benzonatate (TESSALON) 100 MG Cap; Take 1 Capsule by mouth 3 times a day as needed for Cough.   Dispense: 30 Capsule; Refill: 0  - brompheniramine-pseudoephedrine-DM 30-2-10 MG/5ML syrup; Take 5 mL by mouth 1 time a day as needed (at night for cough) for up to 5 days.  Dispense: 25 mL; Refill: 0  - amoxicillin-clavulanate (AUGMENTIN) 875-125 MG Tab; Take 1 Tablet by mouth 2 times a day for 5 days.  Dispense: 10 Tablet; Refill: 0    2. Prediabetes    Based on physical exam along with review of systems I did provide Augmentin today as patient has been sick for over a week with no relief from over-the-counter cold medications.  Medications in the form of Flonase and other medication sent for comfort measures sent as well.  Patient advised take medication with food, drink plenty of fluids.    Patient does have noted prediabetes, states their glucose has been controlled, no major symptoms associated, I did advise the risks of elevated glucose while not feeling well.  Patient encouraged to drink more fluids while sick and check glucose more often. Total time spent with the patient 35 minutes to include, review of chart, charting, assessment, procedure.     Supportive care, differential diagnoses, and indications for immediate follow-up discussed with patient.   Pathogenesis of diagnosis discussed including typical length and natural progression.   Instructed to return to clinic or nearest emergency department for any change in condition, further concerns, or worsening of symptoms.  Patient states understanding of the plan of care and discharge instructions.  Instructed to make an appointment, for follow up, with primary care provider.    Please note that this dictation was created using voice recognition software. I have made every reasonable attempt to correct obvious errors, but I expect that there are errors of grammar and possibly content that I did not discover before finalizing the note.      Rhonda WEBB

## 2025-05-30 ENCOUNTER — APPOINTMENT (OUTPATIENT)
Dept: RADIOLOGY | Facility: IMAGING CENTER | Age: 45
End: 2025-05-30
Attending: PHYSICIAN ASSISTANT
Payer: COMMERCIAL

## 2025-05-30 ENCOUNTER — OFFICE VISIT (OUTPATIENT)
Dept: URGENT CARE | Facility: CLINIC | Age: 45
End: 2025-05-30
Payer: COMMERCIAL

## 2025-05-30 VITALS
WEIGHT: 238 LBS | SYSTOLIC BLOOD PRESSURE: 160 MMHG | RESPIRATION RATE: 20 BRPM | BODY MASS INDEX: 36.07 KG/M2 | TEMPERATURE: 99.2 F | HEIGHT: 68 IN | OXYGEN SATURATION: 98 % | HEART RATE: 80 BPM | DIASTOLIC BLOOD PRESSURE: 92 MMHG

## 2025-05-30 DIAGNOSIS — M79.641 RIGHT HAND PAIN: Primary | ICD-10-CM

## 2025-05-30 PROCEDURE — 73130 X-RAY EXAM OF HAND: CPT | Mod: TC,RT | Performed by: RADIOLOGY

## 2025-05-30 ASSESSMENT — ENCOUNTER SYMPTOMS
TINGLING: 0
WEAKNESS: 0

## 2025-05-30 NOTE — PROGRESS NOTES
"  Subjective:     Dean Mcmullen  is a 44 y.o. male who presents for Hand Injury (X4 days, between pinky and ring fingers, pain in wrist and top of hand, minor swelling)       Presents today for right-sided hand pain ongoing over the last 4 days.  He describes the pain as being between the 4th and 5th metacarpal bones.  States that injury occurred while he was golfing, he was hitting a shot out of the bunker when the club struck the lip of the bunker and came to an abrupt stop.  He felt immediate pain in the hand.  The following day he was unable to open or close his hand, this has improved slightly but he is unable to fully open the fingers or form a fist.  No numbness/tingling or weakness over the area.     Review of Systems   Musculoskeletal:  Positive for joint pain.   Neurological:  Negative for tingling and weakness.      Allergies[1]  Past Medical History[2]     Objective:   BP (!) 160/92 (BP Location: Left arm, Patient Position: Sitting, BP Cuff Size: Adult)   Pulse 80   Temp 37.3 °C (99.2 °F) (Temporal)   Resp 20   Ht 1.727 m (5' 8\")   Wt 108 kg (238 lb)   SpO2 98%   BMI 36.19 kg/m²   Physical Exam  Vitals and nursing note reviewed.   Constitutional:       General: He is not in acute distress.     Appearance: He is not ill-appearing or toxic-appearing.   HENT:      Head: Normocephalic.      Nose: No rhinorrhea.   Eyes:      General: No scleral icterus.     Conjunctiva/sclera: Conjunctivae normal.   Pulmonary:      Effort: Pulmonary effort is normal. No respiratory distress.      Breath sounds: No stridor.   Musculoskeletal:      Cervical back: Neck supple.      Comments: Examination of the right hand does reveal tenderness to palpation over the fourth metacarpal and tenderness of the soft tissue in between the 4th and 5th metacarpal.  Patient is unable to fully extend or close his fingers.  He also has pain with medial and lateral deviation of the 4th and 5th digits.  Minimal pain with " toggling the 4th and 5th metacarpal bones   Neurological:      Mental Status: He is alert and oriented to person, place, and time.   Psychiatric:         Mood and Affect: Mood normal.         Behavior: Behavior normal.         Thought Content: Thought content normal.         Judgment: Judgment normal.           Diagnostic testing:    Right hand x-ray series  Radiologist IMPRESSION:     No evidence of acute fracture or dislocation.    Assessment/Plan:     Encounter Diagnoses   Name Primary?    Right hand pain Yes         Plan for care for today's complaint includes ordering right hand x-ray series, this was negative for acute fracture.  Reassured the patient that current symptoms are likely related to soft tissue injury.  Encouraged over-the-counter medication use and activity modification for symptom support.  Continue to monitor symptoms and return to urgent care or follow-up with primary care provider if symptoms remain ongoing.  Follow-up in the emergency department if symptoms become severe, ER precautions discussed in office today.    See AVS Instructions below for written guidance provided to patient on after-visit management and care in addition to our verbal discussion during the visit.    Please note that this dictation was created using voice recognition software. I have attempted to correct all errors, but there may be sound-alike, spelling, grammar and possibly content errors that I did not discover before finalizing the note.    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